# Patient Record
Sex: MALE | Race: WHITE | NOT HISPANIC OR LATINO | Employment: FULL TIME | URBAN - METROPOLITAN AREA
[De-identification: names, ages, dates, MRNs, and addresses within clinical notes are randomized per-mention and may not be internally consistent; named-entity substitution may affect disease eponyms.]

---

## 2017-02-06 ENCOUNTER — OFFICE VISIT (OUTPATIENT)
Dept: FAMILY MEDICINE CLINIC | Facility: CLINIC | Age: 37
End: 2017-02-06

## 2017-02-06 VITALS
HEIGHT: 66 IN | TEMPERATURE: 97.6 F | WEIGHT: 159.8 LBS | DIASTOLIC BLOOD PRESSURE: 74 MMHG | OXYGEN SATURATION: 97 % | HEART RATE: 77 BPM | BODY MASS INDEX: 25.68 KG/M2 | SYSTOLIC BLOOD PRESSURE: 110 MMHG

## 2017-02-06 DIAGNOSIS — F41.9 ANXIETY: Primary | ICD-10-CM

## 2017-02-06 PROCEDURE — 99213 OFFICE O/P EST LOW 20 MIN: CPT | Performed by: FAMILY MEDICINE

## 2017-02-06 RX ORDER — ALPRAZOLAM 1 MG/1
TABLET ORAL
Qty: 120 TABLET | Refills: 2 | Status: SHIPPED | OUTPATIENT
Start: 2017-02-06 | End: 2017-09-05 | Stop reason: SDUPTHER

## 2017-02-06 NOTE — PROGRESS NOTES
Subjective   Dylan Blank is a 36 y.o. male.     History of Present Illness   Dylan Blank 36 y.o. male who presents today for routine follow up check and medication refills.  he has a history of   Patient Active Problem List   Diagnosis   • Anxiety   • DDD (degenerative disc disease), cervical   .  Since the last visit, he has overall felt well.  He has anxiety is well controlled with medication.  he has been compliant with current medications have reviewed them.  The patient denies medication side effects.        The following portions of the patient's history were reviewed and updated as appropriate: allergies, current medications, past family history, past medical history, past social history, past surgical history and problem list.    Review of Systems   Constitutional: Negative for fatigue.   Respiratory: Negative for shortness of breath.    Cardiovascular: Negative for chest pain and palpitations.   Gastrointestinal: Negative for nausea and vomiting.   Skin: Negative.    Psychiatric/Behavioral: The patient is nervous/anxious.        Objective   Physical Exam   Constitutional: He appears well-developed and well-nourished.   HENT:   Head: Normocephalic.   Eyes: EOM are normal. Pupils are equal, round, and reactive to light.   Cardiovascular: Normal rate, regular rhythm and normal heart sounds.    Pulmonary/Chest: Effort normal and breath sounds normal.   Skin: Skin is warm and dry.   Psychiatric: He has a normal mood and affect. His behavior is normal. Judgment and thought content normal.   Vitals reviewed.      Assessment/Plan   Dylan was seen today for med refill.    Diagnoses and all orders for this visit:    Anxiety  -     ALPRAZolam (XANAX) 1 MG tablet; 1 po Q 6 hrs prn

## 2017-09-05 ENCOUNTER — OFFICE VISIT (OUTPATIENT)
Dept: FAMILY MEDICINE CLINIC | Facility: CLINIC | Age: 37
End: 2017-09-05

## 2017-09-05 VITALS
HEART RATE: 69 BPM | BODY MASS INDEX: 24.59 KG/M2 | TEMPERATURE: 98.7 F | SYSTOLIC BLOOD PRESSURE: 120 MMHG | DIASTOLIC BLOOD PRESSURE: 78 MMHG | WEIGHT: 153 LBS | RESPIRATION RATE: 16 BRPM | HEIGHT: 66 IN

## 2017-09-05 DIAGNOSIS — F43.10 PTSD (POST-TRAUMATIC STRESS DISORDER): ICD-10-CM

## 2017-09-05 PROCEDURE — 99213 OFFICE O/P EST LOW 20 MIN: CPT | Performed by: FAMILY MEDICINE

## 2017-09-05 RX ORDER — ALPRAZOLAM 1 MG/1
1 TABLET ORAL 2 TIMES DAILY PRN
Qty: 60 TABLET | Refills: 0 | Status: ON HOLD | OUTPATIENT
Start: 2017-09-05 | End: 2018-01-30

## 2017-09-05 NOTE — PROGRESS NOTES
"Subjective   Dylan Blank is a 37 y.o. male.     History of Present Illness     Chief Complaint:   Chief Complaint   Patient presents with   • Anxiety     MED REFILL - NATASHA -= FORMER DR KLEIN - PT DID NOT RECEIVE LETTER IN MAIL  -PHQ9 DONE        Dylan Blank 37 y.o. male who presents today To see me for the first time (former Dr. Hernandez pt)  for Medical Management of the below listed issues and medication refills.  he has a problem list of   Patient Active Problem List   Diagnosis   • Anxiety   • DDD (degenerative disc disease), cervical   .  Since the last visit, he has overall felt well.  he has been compliant with   Current Outpatient Prescriptions:   •  ALPRAZolam (XANAX) 1 MG tablet, Take 1 tablet by mouth 2 (Two) Times a Day As Needed for Anxiety., Disp: 60 tablet, Rfl: 0.  he denies medication side effects.    All of the chronic condition(s) listed above are stable w/o issues.    /78  Pulse 69  Temp 98.7 °F (37.1 °C) (Oral)   Resp 16  Ht 66\" (167.6 cm)  Wt 153 lb (69.4 kg)  BMI 24.69 kg/m2    Results for orders placed or performed in visit on 01/06/16    COLONOSCOPY   Result Value Ref Range     Colonoscopy no polyps; repeat 5 years          The following portions of the patient's history were reviewed and updated as appropriate: allergies, current medications, past family history, past medical history, past social history, past surgical history and problem list.    Review of Systems   Constitutional: Negative for activity change, chills, fatigue and fever.   Respiratory: Negative for cough and shortness of breath.    Cardiovascular: Negative for chest pain and palpitations.   Gastrointestinal: Negative for abdominal pain.   Endocrine: Negative for cold intolerance.   Psychiatric/Behavioral: Negative for behavioral problems, dysphoric mood and suicidal ideas. The patient is not nervous/anxious.      /78  Pulse 69  Temp 98.7 °F (37.1 °C) (Oral)   Resp 16  Ht 66\" (167.6 cm)  " Wt 153 lb (69.4 kg)  BMI 24.69 kg/m2    Objective   Physical Exam   Constitutional: He appears well-developed and well-nourished.   Neck: Neck supple. No thyromegaly present.   Cardiovascular: Normal rate and regular rhythm.    No murmur heard.  Pulmonary/Chest: Effort normal and breath sounds normal.   Abdominal: Bowel sounds are normal.   Psychiatric: He has a normal mood and affect. His behavior is normal.   Nursing note and vitals reviewed.    The patient has read and signed the [facility] Controlled Substance Contract.  I will continue to see patient for regular follow up appointments.  They are well controlled on their medication.  NATASHA has been reviewed by me and is updated every 3 months. The patient is aware of the potential for addiction and dependence.    Assessment/Plan   Dylan was seen today for anxiety.    Diagnoses and all orders for this visit:    PTSD (post-traumatic stress disorder)  -     ALPRAZolam (XANAX) 1 MG tablet; Take 1 tablet by mouth 2 (Two) Times a Day As Needed for Anxiety.  -     Ambulatory Referral to Psychiatry    Pt understands this is the only script for the Xanax I will be giving him and that he will need to be seeing psychiatry going forward for this.

## 2017-12-08 ENCOUNTER — OFFICE VISIT (OUTPATIENT)
Dept: FAMILY MEDICINE CLINIC | Facility: CLINIC | Age: 37
End: 2017-12-08

## 2017-12-08 VITALS
OXYGEN SATURATION: 97 % | HEIGHT: 66 IN | RESPIRATION RATE: 18 BRPM | TEMPERATURE: 98 F | HEART RATE: 68 BPM | WEIGHT: 161 LBS | DIASTOLIC BLOOD PRESSURE: 93 MMHG | BODY MASS INDEX: 25.88 KG/M2 | SYSTOLIC BLOOD PRESSURE: 143 MMHG

## 2017-12-08 DIAGNOSIS — J02.9 SORE THROAT: Primary | ICD-10-CM

## 2017-12-08 LAB
EXPIRATION DATE: NORMAL
INTERNAL CONTROL: NORMAL
Lab: NORMAL
S PYO AG THROAT QL: NEGATIVE

## 2017-12-08 PROCEDURE — 87880 STREP A ASSAY W/OPTIC: CPT | Performed by: NURSE PRACTITIONER

## 2017-12-08 PROCEDURE — 99213 OFFICE O/P EST LOW 20 MIN: CPT | Performed by: NURSE PRACTITIONER

## 2017-12-08 RX ORDER — PREDNISONE 20 MG/1
20 TABLET ORAL 2 TIMES DAILY
Qty: 10 TABLET | Refills: 0 | Status: SHIPPED | OUTPATIENT
Start: 2017-12-08 | End: 2017-12-13

## 2017-12-08 NOTE — PROGRESS NOTES
Subjective   Dylan Blank is a 37 y.o. male.     History of Present Illness   Dylan Blank 37 y.o. male who presents for evaluation of sore throat. Symptoms include sore throat, swollen glands, post nasal drip, headache, pain while swallowing and sinus pressure off and on for the past several days.  Onset of symptoms was 5 days ago, unchanged since that time. Patient denies shortness of breath, wheezing, fever.   Evaluation to date: none Treatment to date:  none.     The following portions of the patient's history were reviewed and updated as appropriate: allergies, current medications, past family history, past medical history, past social history, past surgical history and problem list.    Review of Systems   Constitutional: Negative for chills and fever.   HENT: Positive for postnasal drip and sinus pressure. Negative for congestion, ear pain and sinus pain.    Respiratory: Negative for cough.        Objective   Physical Exam   Constitutional: He is oriented to person, place, and time. He appears well-developed and well-nourished.   HENT:   Right Ear: Tympanic membrane, external ear and ear canal normal.   Left Ear: Tympanic membrane, external ear and ear canal normal.   Nose: Right sinus exhibits no maxillary sinus tenderness and no frontal sinus tenderness. Left sinus exhibits no maxillary sinus tenderness and no frontal sinus tenderness.   Mouth/Throat: Uvula is midline and oropharynx is clear and moist.   Cardiovascular: Normal rate and regular rhythm.    Pulmonary/Chest: Effort normal and breath sounds normal.   Neurological: He is oriented to person, place, and time.   Skin: Skin is warm and dry.   Psychiatric: He has a normal mood and affect. His behavior is normal. Judgment and thought content normal.   Nursing note and vitals reviewed.      Assessment/Plan   Dylan was seen today for sore throat.    Diagnoses and all orders for this visit:    Sore throat  -     POC Rapid Strep A  -     predniSONE  (DELTASONE) 20 MG tablet; Take 1 tablet by mouth 2 (Two) Times a Day for 5 days.

## 2018-01-29 ENCOUNTER — HOSPITAL ENCOUNTER (INPATIENT)
Facility: HOSPITAL | Age: 38
LOS: 3 days | Discharge: HOME | End: 2018-02-02
Attending: EMERGENCY MEDICINE | Admitting: INTERNAL MEDICINE

## 2018-01-29 ENCOUNTER — HOSPITAL ENCOUNTER (EMERGENCY)
Facility: HOSPITAL | Age: 38
Discharge: LEFT BEFORE DISCHARGE | End: 2018-01-29

## 2018-01-29 ENCOUNTER — APPOINTMENT (OUTPATIENT)
Dept: CT IMAGING | Facility: HOSPITAL | Age: 38
End: 2018-01-29

## 2018-01-29 ENCOUNTER — APPOINTMENT (OUTPATIENT)
Dept: ULTRASOUND IMAGING | Facility: HOSPITAL | Age: 38
End: 2018-01-29

## 2018-01-29 DIAGNOSIS — D69.6 THROMBOCYTOPENIA (HCC): ICD-10-CM

## 2018-01-29 DIAGNOSIS — J18.9 ATYPICAL PNEUMONIA: Primary | ICD-10-CM

## 2018-01-29 DIAGNOSIS — D72.810 LYMPHOPENIA: ICD-10-CM

## 2018-01-29 LAB
ALBUMIN SERPL-MCNC: 4.3 G/DL (ref 3.5–5.2)
ALBUMIN/GLOB SERPL: 1.3 G/DL
ALP SERPL-CCNC: 84 U/L (ref 39–117)
ALT SERPL W P-5'-P-CCNC: 88 U/L (ref 1–41)
ANION GAP SERPL CALCULATED.3IONS-SCNC: 12.9 MMOL/L
AST SERPL-CCNC: 38 U/L (ref 1–40)
BACTERIA UR QL AUTO: ABNORMAL /HPF
BASOPHILS # BLD AUTO: 0.01 10*3/MM3 (ref 0–0.2)
BASOPHILS NFR BLD AUTO: 0.3 % (ref 0–1.5)
BILIRUB SERPL-MCNC: 1 MG/DL (ref 0.1–1.2)
BILIRUB UR QL STRIP: NEGATIVE
BUN BLD-MCNC: 12 MG/DL (ref 6–20)
BUN/CREAT SERPL: 10.5 (ref 7–25)
CALCIUM SPEC-SCNC: 9.2 MG/DL (ref 8.6–10.5)
CHLORIDE SERPL-SCNC: 93 MMOL/L (ref 98–107)
CLARITY UR: ABNORMAL
CO2 SERPL-SCNC: 26.1 MMOL/L (ref 22–29)
COLOR UR: ABNORMAL
CREAT BLD-MCNC: 1.14 MG/DL (ref 0.76–1.27)
DEPRECATED RDW RBC AUTO: 40.2 FL (ref 37–54)
EOSINOPHIL # BLD AUTO: 0 10*3/MM3 (ref 0–0.7)
EOSINOPHIL NFR BLD AUTO: 0 % (ref 0.3–6.2)
ERYTHROCYTE [DISTWIDTH] IN BLOOD BY AUTOMATED COUNT: 12.4 % (ref 11.5–14.5)
GFR SERPL CREATININE-BSD FRML MDRD: 72 ML/MIN/1.73
GLOBULIN UR ELPH-MCNC: 3.4 GM/DL
GLUCOSE BLD-MCNC: 99 MG/DL (ref 65–99)
GLUCOSE UR STRIP-MCNC: NEGATIVE MG/DL
HCT VFR BLD AUTO: 44.1 % (ref 40.4–52.2)
HGB BLD-MCNC: 15.1 G/DL (ref 13.7–17.6)
HGB UR QL STRIP.AUTO: ABNORMAL
HYALINE CASTS UR QL AUTO: ABNORMAL /LPF
IMM GRANULOCYTES # BLD: 0 10*3/MM3 (ref 0–0.03)
IMM GRANULOCYTES NFR BLD: 0 % (ref 0–0.5)
INR PPP: 1.08 (ref 0.9–1.1)
KETONES UR QL STRIP: ABNORMAL
LEUKOCYTE ESTERASE UR QL STRIP.AUTO: NEGATIVE
LYMPHOCYTES # BLD AUTO: 0.56 10*3/MM3 (ref 0.9–4.8)
LYMPHOCYTES NFR BLD AUTO: 16.8 % (ref 19.6–45.3)
MCH RBC QN AUTO: 30.6 PG (ref 27–32.7)
MCHC RBC AUTO-ENTMCNC: 34.2 G/DL (ref 32.6–36.4)
MCV RBC AUTO: 89.5 FL (ref 79.8–96.2)
MONOCYTES # BLD AUTO: 0.14 10*3/MM3 (ref 0.2–1.2)
MONOCYTES NFR BLD AUTO: 4.2 % (ref 5–12)
NEUTROPHILS # BLD AUTO: 2.63 10*3/MM3 (ref 1.9–8.1)
NEUTROPHILS NFR BLD AUTO: 78.7 % (ref 42.7–76)
NITRITE UR QL STRIP: NEGATIVE
NRBC BLD MANUAL-RTO: 0 /100 WBC (ref 0–0)
PH UR STRIP.AUTO: 5.5 [PH] (ref 5–8)
PLATELET # BLD AUTO: 59 10*3/MM3 (ref 140–500)
PMV BLD AUTO: 10.5 FL (ref 6–12)
POTASSIUM BLD-SCNC: 4.4 MMOL/L (ref 3.5–5.2)
PROT SERPL-MCNC: 7.7 G/DL (ref 6–8.5)
PROT UR QL STRIP: ABNORMAL
PROTHROMBIN TIME: 13.6 SECONDS (ref 11.7–14.2)
RBC # BLD AUTO: 4.93 10*6/MM3 (ref 4.6–6)
RBC # UR: ABNORMAL /HPF
REF LAB TEST METHOD: ABNORMAL
SODIUM BLD-SCNC: 132 MMOL/L (ref 136–145)
SP GR UR STRIP: 1.02 (ref 1–1.03)
SQUAMOUS #/AREA URNS HPF: ABNORMAL /HPF
UROBILINOGEN UR QL STRIP: ABNORMAL
WBC NRBC COR # BLD: 3.34 10*3/MM3 (ref 4.5–10.7)
WBC UR QL AUTO: ABNORMAL /HPF

## 2018-01-29 PROCEDURE — 36415 COLL VENOUS BLD VENIPUNCTURE: CPT | Performed by: NURSE PRACTITIONER

## 2018-01-29 PROCEDURE — 81001 URINALYSIS AUTO W/SCOPE: CPT | Performed by: NURSE PRACTITIONER

## 2018-01-29 PROCEDURE — 85379 FIBRIN DEGRADATION QUANT: CPT | Performed by: EMERGENCY MEDICINE

## 2018-01-29 PROCEDURE — 93976 VASCULAR STUDY: CPT

## 2018-01-29 PROCEDURE — 99284 EMERGENCY DEPT VISIT MOD MDM: CPT

## 2018-01-29 PROCEDURE — 25010000002 ONDANSETRON PER 1 MG: Performed by: EMERGENCY MEDICINE

## 2018-01-29 PROCEDURE — 85610 PROTHROMBIN TIME: CPT | Performed by: EMERGENCY MEDICINE

## 2018-01-29 PROCEDURE — 74176 CT ABD & PELVIS W/O CONTRAST: CPT

## 2018-01-29 PROCEDURE — 80053 COMPREHEN METABOLIC PANEL: CPT | Performed by: NURSE PRACTITIONER

## 2018-01-29 PROCEDURE — 85025 COMPLETE CBC W/AUTO DIFF WBC: CPT | Performed by: NURSE PRACTITIONER

## 2018-01-29 PROCEDURE — 76870 US EXAM SCROTUM: CPT

## 2018-01-29 RX ORDER — IBUPROFEN 200 MG
200 TABLET ORAL EVERY 6 HOURS PRN
COMMUNITY
End: 2020-08-19

## 2018-01-29 RX ORDER — ONDANSETRON 2 MG/ML
4 INJECTION INTRAMUSCULAR; INTRAVENOUS ONCE
Status: COMPLETED | OUTPATIENT
Start: 2018-01-29 | End: 2018-01-29

## 2018-01-29 RX ORDER — HYDROMORPHONE HYDROCHLORIDE 1 MG/ML
0.5 INJECTION, SOLUTION INTRAMUSCULAR; INTRAVENOUS; SUBCUTANEOUS ONCE
Status: COMPLETED | OUTPATIENT
Start: 2018-01-29 | End: 2018-01-29

## 2018-01-29 RX ADMIN — SODIUM CHLORIDE 1000 ML: 9 INJECTION, SOLUTION INTRAVENOUS at 23:25

## 2018-01-29 RX ADMIN — HYDROMORPHONE HYDROCHLORIDE 0.5 MG: 10 INJECTION INTRAMUSCULAR; INTRAVENOUS; SUBCUTANEOUS at 23:20

## 2018-01-29 RX ADMIN — ONDANSETRON 4 MG: 2 INJECTION INTRAMUSCULAR; INTRAVENOUS at 23:20

## 2018-01-30 ENCOUNTER — APPOINTMENT (OUTPATIENT)
Dept: GENERAL RADIOLOGY | Facility: HOSPITAL | Age: 38
End: 2018-01-30

## 2018-01-30 PROBLEM — Z72.0 TOBACCO ABUSE: Status: ACTIVE | Noted: 2018-01-30

## 2018-01-30 PROBLEM — Z87.09 H/O INFLUENZA: Status: ACTIVE | Noted: 2018-01-30

## 2018-01-30 PROBLEM — R09.1: Status: ACTIVE | Noted: 2018-01-30

## 2018-01-30 PROBLEM — J18.9 ATYPICAL PNEUMONIA: Status: ACTIVE | Noted: 2018-01-30

## 2018-01-30 LAB
ALBUMIN SERPL-MCNC: 3.2 G/DL (ref 3.5–5.2)
ALBUMIN/GLOB SERPL: 1.2 G/DL
ALP SERPL-CCNC: 66 U/L (ref 39–117)
ALT SERPL W P-5'-P-CCNC: 64 U/L (ref 1–41)
ANION GAP SERPL CALCULATED.3IONS-SCNC: 12.6 MMOL/L
AST SERPL-CCNC: 27 U/L (ref 1–40)
B PERT DNA SPEC QL NAA+PROBE: NOT DETECTED
BASOPHILS # BLD AUTO: 0.01 10*3/MM3 (ref 0–0.2)
BASOPHILS NFR BLD AUTO: 0.3 % (ref 0–1.5)
BILIRUB SERPL-MCNC: 0.6 MG/DL (ref 0.1–1.2)
BUN BLD-MCNC: 11 MG/DL (ref 6–20)
BUN/CREAT SERPL: 11.7 (ref 7–25)
C PNEUM DNA NPH QL NAA+NON-PROBE: NOT DETECTED
CALCIUM SPEC-SCNC: 7.6 MG/DL (ref 8.6–10.5)
CHLORIDE SERPL-SCNC: 99 MMOL/L (ref 98–107)
CO2 SERPL-SCNC: 22.4 MMOL/L (ref 22–29)
CREAT BLD-MCNC: 0.94 MG/DL (ref 0.76–1.27)
D DIMER PPP FEU-MCNC: 11.89 MCGFEU/ML (ref 0–0.49)
D-LACTATE SERPL-SCNC: 1.1 MMOL/L (ref 0.5–2)
DEPRECATED RDW RBC AUTO: 40.4 FL (ref 37–54)
EOSINOPHIL # BLD AUTO: 0 10*3/MM3 (ref 0–0.7)
EOSINOPHIL NFR BLD AUTO: 0 % (ref 0.3–6.2)
ERYTHROCYTE [DISTWIDTH] IN BLOOD BY AUTOMATED COUNT: 12.4 % (ref 11.5–14.5)
ETHANOL BLD-MCNC: <10 MG/DL (ref 0–10)
ETHANOL UR QL: <0.01 %
FLUAV AG NPH QL: NEGATIVE
FLUAV H1 2009 PAND RNA NPH QL NAA+PROBE: NOT DETECTED
FLUAV H1 HA GENE NPH QL NAA+PROBE: NOT DETECTED
FLUAV H3 RNA NPH QL NAA+PROBE: NOT DETECTED
FLUAV SUBTYP SPEC NAA+PROBE: NOT DETECTED
FLUBV AG NPH QL IA: NEGATIVE
FLUBV RNA ISLT QL NAA+PROBE: NOT DETECTED
GFR SERPL CREATININE-BSD FRML MDRD: 90 ML/MIN/1.73
GLOBULIN UR ELPH-MCNC: 2.7 GM/DL
GLUCOSE BLD-MCNC: 110 MG/DL (ref 65–99)
HADV DNA SPEC NAA+PROBE: NOT DETECTED
HAV IGM SERPL QL IA: NORMAL
HBV CORE IGM SERPL QL IA: NORMAL
HBV SURFACE AG SERPL QL IA: NORMAL
HCOV 229E RNA SPEC QL NAA+PROBE: NOT DETECTED
HCOV HKU1 RNA SPEC QL NAA+PROBE: NOT DETECTED
HCOV NL63 RNA SPEC QL NAA+PROBE: NOT DETECTED
HCOV OC43 RNA SPEC QL NAA+PROBE: NOT DETECTED
HCT VFR BLD AUTO: 36.5 % (ref 40.4–52.2)
HCV AB SER DONR QL: NORMAL
HGB BLD-MCNC: 12.4 G/DL (ref 13.7–17.6)
HIV1 P24 AG SER QL: NORMAL
HIV1+2 AB SER QL: NORMAL
HMPV RNA NPH QL NAA+NON-PROBE: NOT DETECTED
HPIV1 RNA SPEC QL NAA+PROBE: NOT DETECTED
HPIV2 RNA SPEC QL NAA+PROBE: NOT DETECTED
HPIV3 RNA NPH QL NAA+PROBE: NOT DETECTED
HPIV4 P GENE NPH QL NAA+PROBE: NOT DETECTED
IMM GRANULOCYTES # BLD: 0 10*3/MM3 (ref 0–0.03)
IMM GRANULOCYTES NFR BLD: 0 % (ref 0–0.5)
LDH SERPL-CCNC: 540 U/L (ref 135–225)
LYMPHOCYTES # BLD AUTO: 0.85 10*3/MM3 (ref 0.9–4.8)
LYMPHOCYTES NFR BLD AUTO: 25.6 % (ref 19.6–45.3)
M PNEUMO IGG SER IA-ACNC: NOT DETECTED
MCH RBC QN AUTO: 30.6 PG (ref 27–32.7)
MCHC RBC AUTO-ENTMCNC: 34 G/DL (ref 32.6–36.4)
MCV RBC AUTO: 90.1 FL (ref 79.8–96.2)
MONOCYTES # BLD AUTO: 0.08 10*3/MM3 (ref 0.2–1.2)
MONOCYTES NFR BLD AUTO: 2.4 % (ref 5–12)
NEUTROPHILS # BLD AUTO: 2.38 10*3/MM3 (ref 1.9–8.1)
NEUTROPHILS NFR BLD AUTO: 71.7 % (ref 42.7–76)
PLATELET # BLD AUTO: 62 10*3/MM3 (ref 140–500)
PMV BLD AUTO: 10.2 FL (ref 6–12)
POTASSIUM BLD-SCNC: 3.8 MMOL/L (ref 3.5–5.2)
PROCALCITONIN SERPL-MCNC: 0.33 NG/ML (ref 0.1–0.25)
PROT SERPL-MCNC: 5.9 G/DL (ref 6–8.5)
RBC # BLD AUTO: 4.05 10*6/MM3 (ref 4.6–6)
RHINOVIRUS RNA SPEC NAA+PROBE: NOT DETECTED
RSV RNA NPH QL NAA+NON-PROBE: NOT DETECTED
SODIUM BLD-SCNC: 134 MMOL/L (ref 136–145)
WBC NRBC COR # BLD: 3.32 10*3/MM3 (ref 4.5–10.7)

## 2018-01-30 PROCEDURE — 87804 INFLUENZA ASSAY W/OPTIC: CPT | Performed by: EMERGENCY MEDICINE

## 2018-01-30 PROCEDURE — 85025 COMPLETE CBC W/AUTO DIFF WBC: CPT | Performed by: INTERNAL MEDICINE

## 2018-01-30 PROCEDURE — 80307 DRUG TEST PRSMV CHEM ANLYZR: CPT | Performed by: EMERGENCY MEDICINE

## 2018-01-30 PROCEDURE — 71046 X-RAY EXAM CHEST 2 VIEWS: CPT

## 2018-01-30 PROCEDURE — 87581 M.PNEUMON DNA AMP PROBE: CPT | Performed by: INTERNAL MEDICINE

## 2018-01-30 PROCEDURE — 83605 ASSAY OF LACTIC ACID: CPT | Performed by: EMERGENCY MEDICINE

## 2018-01-30 PROCEDURE — 87798 DETECT AGENT NOS DNA AMP: CPT | Performed by: INTERNAL MEDICINE

## 2018-01-30 PROCEDURE — 87899 AGENT NOS ASSAY W/OPTIC: CPT | Performed by: EMERGENCY MEDICINE

## 2018-01-30 PROCEDURE — 25010000002 AZITHROMYCIN PER 500 MG: Performed by: INTERNAL MEDICINE

## 2018-01-30 PROCEDURE — 25010000002 CEFTRIAXONE PER 250 MG: Performed by: EMERGENCY MEDICINE

## 2018-01-30 PROCEDURE — 87486 CHLMYD PNEUM DNA AMP PROBE: CPT | Performed by: INTERNAL MEDICINE

## 2018-01-30 PROCEDURE — 87633 RESP VIRUS 12-25 TARGETS: CPT | Performed by: INTERNAL MEDICINE

## 2018-01-30 PROCEDURE — 80074 ACUTE HEPATITIS PANEL: CPT | Performed by: INTERNAL MEDICINE

## 2018-01-30 PROCEDURE — 87040 BLOOD CULTURE FOR BACTERIA: CPT | Performed by: EMERGENCY MEDICINE

## 2018-01-30 PROCEDURE — 36415 COLL VENOUS BLD VENIPUNCTURE: CPT

## 2018-01-30 PROCEDURE — 83615 LACTATE (LD) (LDH) ENZYME: CPT | Performed by: EMERGENCY MEDICINE

## 2018-01-30 PROCEDURE — 80053 COMPREHEN METABOLIC PANEL: CPT | Performed by: INTERNAL MEDICINE

## 2018-01-30 PROCEDURE — 84145 PROCALCITONIN (PCT): CPT | Performed by: EMERGENCY MEDICINE

## 2018-01-30 PROCEDURE — G0432 EIA HIV-1/HIV-2 SCREEN: HCPCS | Performed by: EMERGENCY MEDICINE

## 2018-01-30 RX ORDER — IBUPROFEN 200 MG
200 TABLET ORAL EVERY 6 HOURS PRN
Status: DISCONTINUED | OUTPATIENT
Start: 2018-01-30 | End: 2018-02-02 | Stop reason: HOSPADM

## 2018-01-30 RX ORDER — CEFTRIAXONE SODIUM 1 G/50ML
1 INJECTION, SOLUTION INTRAVENOUS ONCE
Status: COMPLETED | OUTPATIENT
Start: 2018-01-30 | End: 2018-01-30

## 2018-01-30 RX ORDER — CEFTRIAXONE SODIUM 1 G/50ML
1 INJECTION, SOLUTION INTRAVENOUS EVERY 24 HOURS
Status: DISCONTINUED | OUTPATIENT
Start: 2018-01-31 | End: 2018-02-01

## 2018-01-30 RX ORDER — ALPRAZOLAM 0.5 MG/1
0.5 TABLET ORAL 2 TIMES DAILY PRN
Status: DISCONTINUED | OUTPATIENT
Start: 2018-01-30 | End: 2018-01-30

## 2018-01-30 RX ORDER — HYDROCODONE BITARTRATE AND ACETAMINOPHEN 5; 325 MG/1; MG/1
1 TABLET ORAL EVERY 6 HOURS PRN
Status: DISCONTINUED | OUTPATIENT
Start: 2018-01-30 | End: 2018-02-02 | Stop reason: HOSPADM

## 2018-01-30 RX ORDER — ACETAMINOPHEN 325 MG/1
650 TABLET ORAL EVERY 6 HOURS PRN
Status: DISCONTINUED | OUTPATIENT
Start: 2018-01-30 | End: 2018-01-31

## 2018-01-30 RX ORDER — SODIUM CHLORIDE 9 MG/ML
75 INJECTION, SOLUTION INTRAVENOUS CONTINUOUS
Status: DISCONTINUED | OUTPATIENT
Start: 2018-01-30 | End: 2018-02-02

## 2018-01-30 RX ORDER — ALPRAZOLAM 0.5 MG/1
0.5 TABLET ORAL 2 TIMES DAILY PRN
COMMUNITY
End: 2022-03-10

## 2018-01-30 RX ORDER — ONDANSETRON 2 MG/ML
4 INJECTION INTRAMUSCULAR; INTRAVENOUS EVERY 6 HOURS PRN
Status: DISCONTINUED | OUTPATIENT
Start: 2018-01-30 | End: 2018-02-02 | Stop reason: HOSPADM

## 2018-01-30 RX ORDER — ALPRAZOLAM 0.5 MG/1
0.5 TABLET ORAL 2 TIMES DAILY PRN
Status: DISCONTINUED | OUTPATIENT
Start: 2018-01-30 | End: 2018-01-31

## 2018-01-30 RX ORDER — ACETAMINOPHEN 500 MG
1000 TABLET ORAL ONCE
Status: COMPLETED | OUTPATIENT
Start: 2018-01-30 | End: 2018-01-30

## 2018-01-30 RX ADMIN — AZITHROMYCIN 500 MG: 500 INJECTION, POWDER, LYOPHILIZED, FOR SOLUTION INTRAVENOUS at 02:44

## 2018-01-30 RX ADMIN — IBUPROFEN 200 MG: 200 TABLET, FILM COATED ORAL at 16:40

## 2018-01-30 RX ADMIN — DOXYCYCLINE 100 MG: 100 INJECTION, POWDER, LYOPHILIZED, FOR SOLUTION INTRAVENOUS at 02:02

## 2018-01-30 RX ADMIN — ACETAMINOPHEN 650 MG: 325 TABLET, FILM COATED ORAL at 12:20

## 2018-01-30 RX ADMIN — SODIUM CHLORIDE 75 ML/HR: 9 INJECTION, SOLUTION INTRAVENOUS at 02:00

## 2018-01-30 RX ADMIN — ACETAMINOPHEN 1000 MG: 500 TABLET ORAL at 01:19

## 2018-01-30 RX ADMIN — HYDROCODONE BITARTRATE AND ACETAMINOPHEN 1 TABLET: 5; 325 TABLET ORAL at 12:25

## 2018-01-30 RX ADMIN — HYDROCODONE BITARTRATE AND ACETAMINOPHEN 1 TABLET: 5; 325 TABLET ORAL at 17:57

## 2018-01-30 RX ADMIN — CEFTRIAXONE SODIUM 1 G: 1 INJECTION, SOLUTION INTRAVENOUS at 23:03

## 2018-01-30 RX ADMIN — SODIUM CHLORIDE 75 ML/HR: 9 INJECTION, SOLUTION INTRAVENOUS at 14:34

## 2018-01-30 RX ADMIN — IBUPROFEN 200 MG: 200 TABLET, FILM COATED ORAL at 23:03

## 2018-01-30 RX ADMIN — ACETAMINOPHEN 650 MG: 325 TABLET, FILM COATED ORAL at 08:06

## 2018-01-30 RX ADMIN — ALPRAZOLAM 0.5 MG: 0.5 TABLET ORAL at 04:34

## 2018-01-30 RX ADMIN — CEFTRIAXONE SODIUM 1 G: 1 INJECTION, SOLUTION INTRAVENOUS at 01:39

## 2018-01-30 RX ADMIN — ACETAMINOPHEN 325 MG: 325 TABLET, FILM COATED ORAL at 17:57

## 2018-01-30 RX ADMIN — ALPRAZOLAM 0.5 MG: 0.5 TABLET ORAL at 23:03

## 2018-01-31 PROBLEM — D69.6 THROMBOCYTOPENIA (HCC): Status: ACTIVE | Noted: 2018-01-31

## 2018-01-31 LAB
BASOPHILS # BLD AUTO: 0.01 10*3/MM3 (ref 0–0.2)
BASOPHILS NFR BLD AUTO: 0.2 % (ref 0–1.5)
DEPRECATED RDW RBC AUTO: 40.3 FL (ref 37–54)
EOSINOPHIL # BLD AUTO: 0 10*3/MM3 (ref 0–0.7)
EOSINOPHIL NFR BLD AUTO: 0 % (ref 0.3–6.2)
ERYTHROCYTE [DISTWIDTH] IN BLOOD BY AUTOMATED COUNT: 12.4 % (ref 11.5–14.5)
HCT VFR BLD AUTO: 35.6 % (ref 40.4–52.2)
HGB BLD-MCNC: 12.5 G/DL (ref 13.7–17.6)
IMM GRANULOCYTES # BLD: 0 10*3/MM3 (ref 0–0.03)
IMM GRANULOCYTES NFR BLD: 0 % (ref 0–0.5)
LYMPHOCYTES # BLD AUTO: 0.77 10*3/MM3 (ref 0.9–4.8)
LYMPHOCYTES NFR BLD AUTO: 13 % (ref 19.6–45.3)
MCH RBC QN AUTO: 31.1 PG (ref 27–32.7)
MCHC RBC AUTO-ENTMCNC: 35.1 G/DL (ref 32.6–36.4)
MCV RBC AUTO: 88.6 FL (ref 79.8–96.2)
MONOCYTES # BLD AUTO: 0.29 10*3/MM3 (ref 0.2–1.2)
MONOCYTES NFR BLD AUTO: 4.9 % (ref 5–12)
NEUTROPHILS # BLD AUTO: 4.85 10*3/MM3 (ref 1.9–8.1)
NEUTROPHILS NFR BLD AUTO: 81.9 % (ref 42.7–76)
PLATELET # BLD AUTO: 75 10*3/MM3 (ref 140–500)
PMV BLD AUTO: 10.5 FL (ref 6–12)
RBC # BLD AUTO: 4.02 10*6/MM3 (ref 4.6–6)
WBC NRBC COR # BLD: 5.92 10*3/MM3 (ref 4.5–10.7)

## 2018-01-31 PROCEDURE — 25010000002 CEFTRIAXONE PER 250 MG: Performed by: INTERNAL MEDICINE

## 2018-01-31 PROCEDURE — 90791 PSYCH DIAGNOSTIC EVALUATION: CPT

## 2018-01-31 PROCEDURE — 85025 COMPLETE CBC W/AUTO DIFF WBC: CPT | Performed by: INTERNAL MEDICINE

## 2018-01-31 RX ORDER — ALPRAZOLAM 0.5 MG/1
0.5 TABLET ORAL NIGHTLY PRN
Status: DISCONTINUED | OUTPATIENT
Start: 2018-01-31 | End: 2018-02-02 | Stop reason: HOSPADM

## 2018-01-31 RX ORDER — ALPRAZOLAM 0.25 MG/1
0.25 TABLET ORAL 2 TIMES DAILY PRN
Status: DISCONTINUED | OUTPATIENT
Start: 2018-01-31 | End: 2018-02-02 | Stop reason: HOSPADM

## 2018-01-31 RX ORDER — AZITHROMYCIN 250 MG/1
500 TABLET, FILM COATED ORAL DAILY
Status: DISCONTINUED | OUTPATIENT
Start: 2018-01-31 | End: 2018-02-01

## 2018-01-31 RX ORDER — ECHINACEA PURPUREA EXTRACT 125 MG
2 TABLET ORAL AS NEEDED
Status: DISCONTINUED | OUTPATIENT
Start: 2018-01-31 | End: 2018-02-02 | Stop reason: HOSPADM

## 2018-01-31 RX ORDER — ACETAMINOPHEN 325 MG/1
650 TABLET ORAL EVERY 4 HOURS PRN
Status: DISCONTINUED | OUTPATIENT
Start: 2018-01-31 | End: 2018-02-02 | Stop reason: HOSPADM

## 2018-01-31 RX ADMIN — IBUPROFEN 200 MG: 200 TABLET, FILM COATED ORAL at 18:22

## 2018-01-31 RX ADMIN — ACETAMINOPHEN 650 MG: 325 TABLET, FILM COATED ORAL at 21:40

## 2018-01-31 RX ADMIN — IBUPROFEN 200 MG: 200 TABLET, FILM COATED ORAL at 09:16

## 2018-01-31 RX ADMIN — ACETAMINOPHEN 650 MG: 325 TABLET, FILM COATED ORAL at 00:08

## 2018-01-31 RX ADMIN — AZITHROMYCIN 500 MG: 250 TABLET, FILM COATED ORAL at 11:35

## 2018-01-31 RX ADMIN — ACETAMINOPHEN 650 MG: 325 TABLET, FILM COATED ORAL at 05:54

## 2018-01-31 RX ADMIN — SODIUM CHLORIDE 75 ML/HR: 9 INJECTION, SOLUTION INTRAVENOUS at 03:18

## 2018-01-31 RX ADMIN — SODIUM CHLORIDE 75 ML/HR: 9 INJECTION, SOLUTION INTRAVENOUS at 15:56

## 2018-01-31 RX ADMIN — ALPRAZOLAM 0.5 MG: 0.5 TABLET ORAL at 23:06

## 2018-01-31 RX ADMIN — ACETAMINOPHEN 650 MG: 325 TABLET, FILM COATED ORAL at 10:09

## 2018-01-31 RX ADMIN — ACETAMINOPHEN 650 MG: 325 TABLET, FILM COATED ORAL at 17:26

## 2018-02-01 LAB
BASOPHILS # BLD AUTO: 0.01 10*3/MM3 (ref 0–0.2)
BASOPHILS NFR BLD AUTO: 0.1 % (ref 0–1.5)
DEPRECATED RDW RBC AUTO: 40.2 FL (ref 37–54)
EOSINOPHIL # BLD AUTO: 0.01 10*3/MM3 (ref 0–0.7)
EOSINOPHIL NFR BLD AUTO: 0.1 % (ref 0.3–6.2)
ERYTHROCYTE [DISTWIDTH] IN BLOOD BY AUTOMATED COUNT: 12.5 % (ref 11.5–14.5)
HCT VFR BLD AUTO: 34.7 % (ref 40.4–52.2)
HGB BLD-MCNC: 11.7 G/DL (ref 13.7–17.6)
IMM GRANULOCYTES # BLD: 0.02 10*3/MM3 (ref 0–0.03)
IMM GRANULOCYTES NFR BLD: 0.3 % (ref 0–0.5)
LYMPHOCYTES # BLD AUTO: 0.97 10*3/MM3 (ref 0.9–4.8)
LYMPHOCYTES NFR BLD AUTO: 13.2 % (ref 19.6–45.3)
MCH RBC QN AUTO: 30.2 PG (ref 27–32.7)
MCHC RBC AUTO-ENTMCNC: 33.7 G/DL (ref 32.6–36.4)
MCV RBC AUTO: 89.4 FL (ref 79.8–96.2)
MONOCYTES # BLD AUTO: 0.57 10*3/MM3 (ref 0.2–1.2)
MONOCYTES NFR BLD AUTO: 7.8 % (ref 5–12)
NEUTROPHILS # BLD AUTO: 5.76 10*3/MM3 (ref 1.9–8.1)
NEUTROPHILS NFR BLD AUTO: 78.5 % (ref 42.7–76)
PLATELET # BLD AUTO: 108 10*3/MM3 (ref 140–500)
PMV BLD AUTO: 10.4 FL (ref 6–12)
RBC # BLD AUTO: 3.88 10*6/MM3 (ref 4.6–6)
WBC NRBC COR # BLD: 7.34 10*3/MM3 (ref 4.5–10.7)

## 2018-02-01 PROCEDURE — 25010000002 PIPERACILLIN SOD-TAZOBACTAM PER 1 G: Performed by: INTERNAL MEDICINE

## 2018-02-01 PROCEDURE — 85025 COMPLETE CBC W/AUTO DIFF WBC: CPT | Performed by: INTERNAL MEDICINE

## 2018-02-01 PROCEDURE — 25010000002 VANCOMYCIN PER 500 MG: Performed by: INTERNAL MEDICINE

## 2018-02-01 PROCEDURE — 25010000002 CEFTRIAXONE PER 250 MG: Performed by: INTERNAL MEDICINE

## 2018-02-01 RX ORDER — VANCOMYCIN HYDROCHLORIDE 1 G/200ML
15 INJECTION, SOLUTION INTRAVENOUS EVERY 8 HOURS
Status: DISCONTINUED | OUTPATIENT
Start: 2018-02-01 | End: 2018-02-02 | Stop reason: HOSPADM

## 2018-02-01 RX ADMIN — IBUPROFEN 200 MG: 200 TABLET, FILM COATED ORAL at 16:46

## 2018-02-01 RX ADMIN — TAZOBACTAM SODIUM AND PIPERACILLIN SODIUM 3.38 G: 375; 3 INJECTION, SOLUTION INTRAVENOUS at 20:52

## 2018-02-01 RX ADMIN — ALPRAZOLAM 0.25 MG: 0.25 TABLET ORAL at 16:04

## 2018-02-01 RX ADMIN — HYDROCODONE BITARTRATE AND ACETAMINOPHEN 1 TABLET: 5; 325 TABLET ORAL at 13:52

## 2018-02-01 RX ADMIN — VANCOMYCIN HYDROCHLORIDE 1000 MG: 1 INJECTION, SOLUTION INTRAVENOUS at 21:49

## 2018-02-01 RX ADMIN — SODIUM CHLORIDE 75 ML/HR: 9 INJECTION, SOLUTION INTRAVENOUS at 06:40

## 2018-02-01 RX ADMIN — ACETAMINOPHEN 650 MG: 325 TABLET, FILM COATED ORAL at 01:46

## 2018-02-01 RX ADMIN — SODIUM CHLORIDE 75 ML/HR: 9 INJECTION, SOLUTION INTRAVENOUS at 20:29

## 2018-02-01 RX ADMIN — CEFTRIAXONE SODIUM 1 G: 1 INJECTION, SOLUTION INTRAVENOUS at 00:48

## 2018-02-01 RX ADMIN — AZITHROMYCIN 500 MG: 250 TABLET, FILM COATED ORAL at 09:10

## 2018-02-01 RX ADMIN — ALPRAZOLAM 0.25 MG: 0.25 TABLET ORAL at 09:10

## 2018-02-01 RX ADMIN — ACETAMINOPHEN 650 MG: 325 TABLET, FILM COATED ORAL at 09:10

## 2018-02-01 RX ADMIN — HYDROCODONE BITARTRATE AND ACETAMINOPHEN 1 TABLET: 5; 325 TABLET ORAL at 17:52

## 2018-02-01 NOTE — PROGRESS NOTES
Continued Stay Note  [facility]     Patient Name: Dylan Blank  MRN: [id number]  Today's Date: 2/1/2018    Admit Date: 1/29/2018          Discharge Plan       02/01/18 1416    Case Management/Social Work Plan    Plan Plan home.  JOCELYN Mckeon    Additional Comments Spoke to pt at bedside.  Pt denies any discharge needs.  Plan home.  JOCELYN Mckeon              Discharge Codes     None            Laura Winter RN

## 2018-02-01 NOTE — PLAN OF CARE
Problem: Patient Care Overview (Adult)  Goal: Plan of Care Review  Outcome: Ongoing (interventions implemented as appropriate)   02/01/18 0646   Coping/Psychosocial Response Interventions   Plan Of Care Reviewed With patient   Patient Care Overview   Progress no change   Outcome Evaluation   Outcome Summary/Follow up Plan PT ANXIOUS, GETTING UPSET ABOUT ROOM TEMP , INSISTING ON GETTING TYLENOL EVEN WITH NICOLAS NORMAL. FINALLY SLEPT AROUND 1230 THRU 6AM. SINUS TACH. O2 IN USE 3L /NC, DROPS TO 86-88% WHEN MOVING OR UPSET. LAST TEMP AT 99.8 AT 540AM.      Goal: Adult Individualization and Mutuality  Outcome: Ongoing (interventions implemented as appropriate)    Goal: Discharge Needs Assessment  Outcome: Ongoing (interventions implemented as appropriate)      Problem: Pain, Acute (Adult)  Goal: Identify Related Risk Factors and Signs and Symptoms  Outcome: Ongoing (interventions implemented as appropriate)    Goal: Acceptable Pain Control/Comfort Level  Outcome: Ongoing (interventions implemented as appropriate)      Problem: Fall Risk (Adult)  Goal: Identify Related Risk Factors and Signs and Symptoms  Outcome: Ongoing (interventions implemented as appropriate)    Goal: Absence of Falls  Outcome: Ongoing (interventions implemented as appropriate)      Problem: Infection, Risk/Actual (Adult)  Goal: Identify Related Risk Factors and Signs and Symptoms  Outcome: Ongoing (interventions implemented as appropriate)    Goal: Infection Prevention/Resolution  Outcome: Ongoing (interventions implemented as appropriate)

## 2018-02-01 NOTE — PROGRESS NOTES
Name: Dylan Blank ADMIT: 2018   : 1980  PCP: VINH Bell    MRN: [id number] LOS: 2 days   AGE/SEX: 37 y.o. male    ROOM: [facility]   Subjective   Patient is feeling much better he walked in the halls and the fever is better.  He has a lot of anxiety.      Objective   Vital Signs  Temp:  [98.7 °F (37.1 °C)-102.9 °F (39.4 °C)] 98.8 °F (37.1 °C)  Heart Rate:  [] 104  Resp:  [18-20] 20  BP: (113-119)/(62-68) 119/62  SpO2:  [90 %-95 %] 92 %  on  Flow (L/min):  [2-3] 3;   O2 Device: nasal cannula  Body mass index is 25.62 kg/(m^2).    Physical Exam   Constitutional: He is oriented to person, place, and time. He appears well-developed and well-nourished. Nasal cannula in place.   HENT:   Head: Atraumatic.   Eyes: Pupils are equal, round, and reactive to light. No scleral icterus.   Cardiovascular: Normal rate and regular rhythm.    Pulmonary/Chest: No accessory muscle usage. No respiratory distress. He has decreased breath sounds. He has no wheezes.   Abdominal: Soft. Normal appearance and bowel sounds are normal. He exhibits no ascites. There is no hepatosplenomegaly.   Neurological: He is alert and oriented to person, place, and time.   Skin: No laceration and no petechiae noted. No cyanosis. Nails show no clubbing.   Psychiatric: He has a normal mood and affect. His behavior is normal.   Vitals reviewed.      Results Review:      Results from last 7 days  Lab Units 18  0557 18  0630 18  0537   WBC 10*3/mm3 7.34 5.92 3.32*   HEMOGLOBIN g/dL 11.7* 12.5* 12.4*   HEMATOCRIT % 34.7* 35.6* 36.5*   PLATELETS 10*3/mm3 108* 75* 62*       Results from last 7 days  Lab Units 18  0537 189   SODIUM mmol/L 134* 132*   POTASSIUM mmol/L 3.8 4.4   CHLORIDE mmol/L 99 93*   CO2 mmol/L 22.4 26.1   BUN mg/dL 11 12   CREATININE mg/dL 0.94 1.14   GLUCOSE mg/dL 110* 99   CALCIUM mg/dL 7.6* 9.2       Results from last 7 days  Lab Units 18  2323   INR  1.08            azithromycin 500 mg Oral Daily   ceftriaxone 1 g Intravenous Q24H       sodium chloride 75 mL/hr Last Rate: 75 mL/hr (02/01/18 0640)   Diet Regular      Assessment/Plan   Active Hospital Problems (** Indicates Principal Problem)    Diagnosis Date Noted   • **Atypical pneumonia [J18.9] 01/30/2018   • Thrombocytopenia [D69.6] 01/31/2018   • Bacterial pleurisy [R09.1] 01/30/2018   • Tobacco abuse [Z72.0] 01/30/2018   • H/O influenza [Z87.09] 01/30/2018      Resolved Hospital Problems    Diagnosis Date Noted Date Resolved   No resolved problems to display.     Platelets improving, So his his white count is also improving.      Addendum  The nurse taking care of the patient called me this evening informing me that he is still having fever. .  At the time of admission he had leukopenia most likely secondary to viral infection but now his white count is increasing which is a good sign.  His CAT scan of the abdomen shows bilateral lower lobe infiltrates.  Clearly patient is not responding to Rocephin and azithromycin and some going to change antibiotics to Zosyn and vancomycin and follow-up with labs.  If he continues to have fever spikes tomorrow we need a dedicated CT of the chest.         Jessica Jiménez MD  [facility]  02/01/18

## 2018-02-02 ENCOUNTER — APPOINTMENT (OUTPATIENT)
Dept: CT IMAGING | Facility: HOSPITAL | Age: 38
End: 2018-02-02

## 2018-02-02 VITALS
OXYGEN SATURATION: 90 % | SYSTOLIC BLOOD PRESSURE: 117 MMHG | HEART RATE: 99 BPM | RESPIRATION RATE: 17 BRPM | DIASTOLIC BLOOD PRESSURE: 73 MMHG | WEIGHT: 158.73 LBS | TEMPERATURE: 98.7 F | HEIGHT: 66 IN | BODY MASS INDEX: 25.51 KG/M2

## 2018-02-02 PROBLEM — I47.1 SVT (SUPRAVENTRICULAR TACHYCARDIA) (HCC): Status: ACTIVE | Noted: 2018-02-02

## 2018-02-02 PROBLEM — J96.01 ACUTE RESPIRATORY FAILURE WITH HYPOXIA (HCC): Status: ACTIVE | Noted: 2018-02-02

## 2018-02-02 PROBLEM — I47.10 SVT (SUPRAVENTRICULAR TACHYCARDIA): Status: ACTIVE | Noted: 2018-02-02

## 2018-02-02 PROBLEM — D69.6 THROMBOCYTOPENIA (HCC): Status: RESOLVED | Noted: 2018-01-31 | Resolved: 2018-02-02

## 2018-02-02 PROBLEM — D64.9 ANEMIA: Status: ACTIVE | Noted: 2018-02-02

## 2018-02-02 LAB
ANION GAP SERPL CALCULATED.3IONS-SCNC: 8.6 MMOL/L
BASOPHILS # BLD AUTO: 0.02 10*3/MM3 (ref 0–0.2)
BASOPHILS NFR BLD AUTO: 0.3 % (ref 0–1.5)
BUN BLD-MCNC: 11 MG/DL (ref 6–20)
BUN/CREAT SERPL: 14.1 (ref 7–25)
CALCIUM SPEC-SCNC: 8 MG/DL (ref 8.6–10.5)
CHLORIDE SERPL-SCNC: 101 MMOL/L (ref 98–107)
CO2 SERPL-SCNC: 27.4 MMOL/L (ref 22–29)
CREAT BLD-MCNC: 0.78 MG/DL (ref 0.76–1.27)
DEPRECATED RDW RBC AUTO: 40.9 FL (ref 37–54)
EOSINOPHIL # BLD AUTO: 0.16 10*3/MM3 (ref 0–0.7)
EOSINOPHIL NFR BLD AUTO: 2.3 % (ref 0.3–6.2)
ERYTHROCYTE [DISTWIDTH] IN BLOOD BY AUTOMATED COUNT: 12.7 % (ref 11.5–14.5)
GFR SERPL CREATININE-BSD FRML MDRD: 111 ML/MIN/1.73
GLUCOSE BLD-MCNC: 108 MG/DL (ref 65–99)
HCT VFR BLD AUTO: 33.3 % (ref 40.4–52.2)
HGB BLD-MCNC: 11.5 G/DL (ref 13.7–17.6)
IMM GRANULOCYTES # BLD: 0.02 10*3/MM3 (ref 0–0.03)
IMM GRANULOCYTES NFR BLD: 0.3 % (ref 0–0.5)
LYMPHOCYTES # BLD AUTO: 1.11 10*3/MM3 (ref 0.9–4.8)
LYMPHOCYTES NFR BLD AUTO: 16.3 % (ref 19.6–45.3)
MCH RBC QN AUTO: 30.5 PG (ref 27–32.7)
MCHC RBC AUTO-ENTMCNC: 34.5 G/DL (ref 32.6–36.4)
MCV RBC AUTO: 88.3 FL (ref 79.8–96.2)
MONOCYTES # BLD AUTO: 0.54 10*3/MM3 (ref 0.2–1.2)
MONOCYTES NFR BLD AUTO: 7.9 % (ref 5–12)
NEUTROPHILS # BLD AUTO: 4.97 10*3/MM3 (ref 1.9–8.1)
NEUTROPHILS NFR BLD AUTO: 72.9 % (ref 42.7–76)
PLATELET # BLD AUTO: 171 10*3/MM3 (ref 140–500)
PMV BLD AUTO: 9.7 FL (ref 6–12)
POTASSIUM BLD-SCNC: 3.6 MMOL/L (ref 3.5–5.2)
RBC # BLD AUTO: 3.77 10*6/MM3 (ref 4.6–6)
SODIUM BLD-SCNC: 137 MMOL/L (ref 136–145)
WBC NRBC COR # BLD: 6.82 10*3/MM3 (ref 4.5–10.7)

## 2018-02-02 PROCEDURE — 25010000002 VANCOMYCIN PER 500 MG: Performed by: INTERNAL MEDICINE

## 2018-02-02 PROCEDURE — 0 IOPAMIDOL PER 1 ML: Performed by: INTERNAL MEDICINE

## 2018-02-02 PROCEDURE — 25010000002 PIPERACILLIN SOD-TAZOBACTAM PER 1 G: Performed by: INTERNAL MEDICINE

## 2018-02-02 PROCEDURE — 94618 PULMONARY STRESS TESTING: CPT

## 2018-02-02 PROCEDURE — 94799 UNLISTED PULMONARY SVC/PX: CPT

## 2018-02-02 PROCEDURE — 85025 COMPLETE CBC W/AUTO DIFF WBC: CPT | Performed by: INTERNAL MEDICINE

## 2018-02-02 PROCEDURE — 71275 CT ANGIOGRAPHY CHEST: CPT

## 2018-02-02 PROCEDURE — 80048 BASIC METABOLIC PNL TOTAL CA: CPT | Performed by: INTERNAL MEDICINE

## 2018-02-02 RX ORDER — SULFAMETHOXAZOLE AND TRIMETHOPRIM 800; 160 MG/1; MG/1
1 TABLET ORAL 2 TIMES DAILY
Qty: 8 TABLET | Refills: 0 | Status: SHIPPED | OUTPATIENT
Start: 2018-02-02 | End: 2018-07-10

## 2018-02-02 RX ORDER — ECHINACEA PURPUREA EXTRACT 125 MG
2 TABLET ORAL AS NEEDED
Start: 2018-02-02 | End: 2019-03-14

## 2018-02-02 RX ORDER — AMOXICILLIN AND CLAVULANATE POTASSIUM 875; 125 MG/1; MG/1
1 TABLET, FILM COATED ORAL EVERY 12 HOURS SCHEDULED
Qty: 8 TABLET | Refills: 0 | Status: SHIPPED | OUTPATIENT
Start: 2018-02-02 | End: 2018-07-10

## 2018-02-02 RX ADMIN — TAZOBACTAM SODIUM AND PIPERACILLIN SODIUM 3.38 G: 375; 3 INJECTION, SOLUTION INTRAVENOUS at 03:14

## 2018-02-02 RX ADMIN — ALPRAZOLAM 0.5 MG: 0.5 TABLET ORAL at 01:57

## 2018-02-02 RX ADMIN — ALPRAZOLAM 0.25 MG: 0.25 TABLET ORAL at 09:00

## 2018-02-02 RX ADMIN — ACETAMINOPHEN 650 MG: 325 TABLET, FILM COATED ORAL at 04:35

## 2018-02-02 RX ADMIN — VANCOMYCIN HYDROCHLORIDE 1000 MG: 1 INJECTION, SOLUTION INTRAVENOUS at 04:03

## 2018-02-02 RX ADMIN — ACETAMINOPHEN 650 MG: 325 TABLET, FILM COATED ORAL at 12:36

## 2018-02-02 RX ADMIN — IOPAMIDOL 95 ML: 755 INJECTION, SOLUTION INTRAVENOUS at 14:00

## 2018-02-02 RX ADMIN — TAZOBACTAM SODIUM AND PIPERACILLIN SODIUM 3.38 G: 375; 3 INJECTION, SOLUTION INTRAVENOUS at 10:04

## 2018-02-02 RX ADMIN — VANCOMYCIN HYDROCHLORIDE 1000 MG: 1 INJECTION, SOLUTION INTRAVENOUS at 12:24

## 2018-02-02 NOTE — DISCHARGE SUMMARY
Date of Admission: 1/29/2018  Date of Discharge:  2/2/2018  Primary Care Physician: Desire Ren, APRN     Discharge Diagnosis:  Active Hospital Problems (** Indicates Principal Problem)    Diagnosis Date Noted   • **Atypical pneumonia [J18.9] 01/30/2018   • Acute respiratory failure with hypoxia [J96.01] 02/02/2018   • SVT (supraventricular tachycardia) [I47.1] 02/02/2018   • Anemia [D64.9] 02/02/2018   • Bacterial pleurisy [R09.1] 01/30/2018   • Tobacco abuse [Z72.0] 01/30/2018   • H/O influenza [Z87.09] 01/30/2018      Resolved Hospital Problems    Diagnosis Date Noted Date Resolved   • Thrombocytopenia [D69.6] 01/31/2018 02/02/2018       Presenting Problem/History of Present Illness:  Lymphopenia [D72.810]  Thrombocytopenia [D69.6]  Atypical pneumonia [J18.9]     Hospital Course:  The patient is a 37 year old Man admitted for Left flank and testicular pain with fever, leukopenia and thrombocytopenia.  CT abdomen neg for nephrolithiasis but shows mild bibasilar opacities. Testicular u/s essentially negative.  He was initially placed on azithromcyin and ceftriaxone for CAP.  He had high fever yesterday so antibiotics were changed to Zosyn and vancomycin.  He has defervesced.  CT of the chest ordered because d-dimer was extremely elevated and he has never had a CT.  It showed no pulmonary embolus but groundglass opacity in both upper lobes.  Patient very anxious to go home and requested discharge today.  I would prefer that he states at night but he has 3 kids at home and needs to be discharged.  Walking oximetry showed that he maintained his O2 sats, so he will not need home O2.  He will need close outpatient follow-up to make sure lung findings resolved completely.  Sinus tachycardia present.  Patient attributes to nicotine withdrawal.  Heart rate has improved today.  IV fluids were discontinued earlier.  Mild anemias present.  Outpatient follow-up      Stable condition; good prognosis    Exam Today:   See today's note    Procedures Performed:  CT angiogram chest with PE protocol to 218 which showed no evidence of PE.  Ground glass opacities bilateral upper lobes.  Small layering bilateral pleural effusions.      CT abdomen pelvis without contrast 1/29/18 which showed patchy lower lobe opacities and tiny left effusion.  Constipation without obstruction  Ultrasound of the scrotum and testicles 1/29/18.  Tiny bilateral hydroceles with small left varicocele    Labs:  Lab Results   Component Value Date    WBC 6.82 02/02/2018    HGB 11.5 (L) 02/02/2018    HCT 33.3 (L) 02/02/2018     02/02/2018     Lab Results   Component Value Date     02/02/2018    K 3.6 02/02/2018     02/02/2018    CO2 27.4 02/02/2018    BUN 11 02/02/2018    CREATININE 0.78 02/02/2018    GLUCOSE 108 (H) 02/02/2018       Results from last 7 days  Lab Units 01/30/18  0137 01/30/18  0107   BLOODCX  No growth at 3 days No growth at 3 days       Consults:   None     Discharge Disposition:  Home or Self Care    Discharge Medications:   Dylan Blank   Home Medication Instructions FABRICE:[id number]    Printed on:02/02/18 1611   Medication Information                      ALPRAZolam (XANAX) 0.5 MG tablet  Take 0.5 mg by mouth 2 (Two) Times a Day As Needed for Anxiety (Pt takes 0.25mg TID prn).             amoxicillin-clavulanate (AUGMENTIN) 875-125 MG per tablet  Take 1 tablet by mouth Every 12 (Twelve) Hours.             ibuprofen (ADVIL,MOTRIN) 200 MG tablet  Take 200 mg by mouth Every 6 (Six) Hours As Needed for Mild Pain .             sodium chloride (OCEAN) 0.65 % nasal spray  2 sprays into each nostril As Needed for Congestion.             sulfamethoxazole-trimethoprim (BACTRIM DS,SEPTRA DS) 800-160 MG per tablet  Take 1 tablet by mouth 2 (Two) Times a Day.                 Discharge Diet:   Diet Instructions     Diet: Regular       Discharge Diet:  Regular                 Activity at Discharge:   Activity Instructions     Activity  as Tolerated                     Follow-up Appointments:    Follow-up Information     Follow up with VINH Bell Follow up in 1 week(s).    Specialty:  Family Medicine    Why:  Pneumonia    Contact information:    [address] [address] [address] [phone]              Test Results Pending at Discharge:   Order Current Status    Blood Culture - Blood, Preliminary result    Blood Culture - Blood, Preliminary result           Cynthia Sanchez MD  02/02/18  4:11 PM    Time Spent on Discharge Activities: 45 minutes.  Medical record reviewed.  Discussed with patient and with nurse.

## 2018-02-02 NOTE — PROGRESS NOTES
"     LOS: 3 days   Primary Care Physician: VINH Bell     Subjective   Tired.  Wants to go home.  Had a lot of sweating during the night.  Last fever was about 5 PM yesterday.  He attributes his elevated heart rate to nicotine withdrawal.  No dizziness or lightheadedness.  Coughing is less.  No pleuritic pain  Twin brother had a PE last year    Vital Signs  Body mass index is 25.62 kg/(m^2).  Temp:  [97.5 °F (36.4 °C)-102.9 °F (39.4 °C)] 98.7 °F (37.1 °C)  Heart Rate:  [] 99  Resp:  [16-19] 17  BP: (101-127)/(60-85) 117/73      Objective:  General Appearance:  In no acute distress.    Vital signs: (most recent): Blood pressure 119/83, pulse 102, temperature 98 °F (36.7 °C), temperature source Oral, resp. rate 19, height 167.6 cm (66\"), weight 72 kg (158 lb 11.7 oz), SpO2 91 %.    Lungs:  There are rales and decreased breath sounds.  No wheezes or rhonchi.  (A few crackles left lateral base initially which then cleared)  Heart: Tachycardia.  Regular rhythm.  No murmur.   Abdomen: Abdomen is soft and non-distended.  Bowel sounds are normal.   There is no abdominal tenderness.   There is no splenomegaly. There is no hepatomegaly.   Extremities: There is no dependent edema.    Neurological: Patient is alert.          Results Review:    I reviewed the patient's new clinical results.      Results from last 7 days  Lab Units 02/02/18  0723 02/01/18  0557   WBC 10*3/mm3 6.82 7.34   HEMOGLOBIN g/dL 11.5* 11.7*   PLATELETS 10*3/mm3 171 108*       Results from last 7 days  Lab Units 02/02/18  0723 01/30/18  0537   SODIUM mmol/L 137 134*   POTASSIUM mmol/L 3.6 3.8   CHLORIDE mmol/L 101 99   CO2 mmol/L 27.4 22.4   BUN mg/dL 11 11   CREATININE mg/dL 0.78 0.94   CALCIUM mg/dL 8.0* 7.6*   GLUCOSE mg/dL 108* 110*       Results from last 7 days  Lab Units 01/29/18  7272   INR  1.08     Hemoglobin A1C:No results found for: HGBA1C    Glucose Range:No results found for: POCGLU    No results found for: " [id number]    No results found for: TSH    Assessment & Plan      Medication Review: Yes    Active Hospital Problems (** Indicates Principal Problem)    Diagnosis Date Noted   • **Atypical pneumonia [J18.9] 01/30/2018   • Acute respiratory failure with hypoxia [J96.01] 02/02/2018   • SVT (supraventricular tachycardia) [I47.1] 02/02/2018   • Anemia [D64.9] 02/02/2018   • Bacterial pleurisy [R09.1] 01/30/2018   • Tobacco abuse [Z72.0] 01/30/2018   • H/O influenza [Z87.09] 01/30/2018      Resolved Hospital Problems    Diagnosis Date Noted Date Resolved   • Thrombocytopenia [D69.6] 01/31/2018 02/02/2018       Assessment/Plan  1.  Acute hypoxic respiratory failure with tachycardia and previous cough with shortness of breath.  D-dimer was greater than 10 so CT chest to rule out PE is indicated.  I ordered that.  Check room air oximetry.  Patient very anxious for discharge home.  He has 3 children.  We will see what CT shows and decide.  2.  SVT.  CT as above.  Could be some nicotine withdrawal as well.  3.  Anemia.  Fairly stable.  Outpatient follow-up    Cynthia Sanchez MD  02/02/18  11:35 AM

## 2018-02-02 NOTE — PROGRESS NOTES
"Pharmacokinetic Consult - Vancomycin & Zosyn Dosing (Initial Note)    Dylan Blank is a 37 y.o. male who is on day 1/5 pharmacy to dose vancomycin & Zosyn for pneumonia.  Pharmacy dosing per Dr. Jiménez's request.   Other antimicrobials: previously on Azithromycin + Ceftriaxone x2 days  Goal vancomycin trough: 15-20 mg/L    Relevant clinical data and objective history reviewed:  167.6 cm (66\")  72 kg (158 lb 11.7 oz)  Body mass index is 25.62 kg/(m^2).     He has a past medical history of Anxiety; DDD (degenerative disc disease), cervical; Lyme disease (2011); [place] spotted fever (2006); and Screening for prostate cancer.    Allergies as of 01/29/2018   • (No Known Allergies)     Vital Signs (last 24 hours)       01/31 0700  -  02/01 0659 02/01 0700  -  02/01 1900   Most Recent    Temp (°F) 98.7 -  (!)102.3    97.6 -  (!)102.9     99.1 (37.3)    Heart Rate 92 -  (!)122    98 -  (!)123     117    Resp   18    16 -  20     16    /58 -  119/73    119/62 -  127/85     127/85    SpO2 (%) 90 -  95    90 -  92     90        Estimated Creatinine Clearance: 108.5 mL/min (by C-G formula based on Cr of 0.94).    Results from last 7 days  Lab Units 01/30/18  0537 01/29/18  2059   CREATININE mg/dL 0.94 1.14       Results from last 7 days  Lab Units 02/01/18  0557 01/31/18  0630 01/30/18  0537   WBC 10*3/mm3 7.34 5.92 3.32*     Baseline culture/source/susceptibility:   BCx x2 (1/30): NGTD  RVP negative     Imaging:  CXR & CT: Bibasilar opacities     Labs:  PCT (1/30): 0.33    Anti-Infectives     Ordered     Dose/Rate Route Frequency Start Stop    02/01/18 1859  piperacillin-tazobactam (ZOSYN) 3.375 g in iso-osmotic dextrose 50 ml (premix)     Ordering Provider:  Jessica Jiménez MD    3.375 g  over 4 Hours Intravenous Every 8 Hours 02/02/18 0130 02/07/18 0129    02/01/18 1859  piperacillin-tazobactam (ZOSYN) 3.375 g in iso-osmotic dextrose 50 ml (premix)     Ordering Provider:  Jessica Jiménez MD    " 3.375 g  over 30 Minutes Intravenous Once 02/01/18 1930 02/01/18 1859  vancomycin (VANCOCIN) in iso-osmotic dextrose IVPB 1 g (premix) 200 mL     Ordering Provider:  Jessica Jiménez MD    15 mg/kg × 72 kg  over 100 Minutes Intravenous Every 8 Hours 02/01/18 1930 02/06/18 1929 02/01/18 1850  Pharmacy to dose vancomycin     Ordering Provider:  Jessica Jiménez MD     Does not apply Continuous PRN 02/01/18 1849 02/06/18 1848    02/01/18 1850  Pharmacy to Dose Zosyn     Ordering Provider:  Jessica Jiménez MD     Does not apply Continuous PRN 02/01/18 1848 02/06/18 1847    01/30/18 0049  cefTRIAXone (ROCEPHIN) IVPB 1 g     Ordering Provider:  Bro Lewis MD    1 g  100 mL/hr over 30 Minutes Intravenous Once 01/30/18 0051 01/30/18 0215    01/30/18 0049  doxycycline (VIBRAMYCIN) 100 mg/100 mL 0.9% NS MBP     Ordering Provider:  Bro Lewis MD    100 mg  over 60 Minutes Intravenous Once 01/30/18 0051 01/30/18 0302         No results found for: VANCOTROUGH  No results found for: VANCORANDOM    Assessment/Plan  1) Begin Vancomycin 1000 mg IV q8h. Trough ordered for 30 mins prior to 4th dose.   2) Begin Zosyn 3.375 g IV x1 over 30 mins followed by 3.375 g IV q8h over 4 hours based on current CrCl > 20 mL/min.   3) Will monitor serum creatinine with AM labs tomorrow.  4) Encourage hydration as allowed by MD to help prevent toxic accumulation; monitor for s/sxn of toxicity including increase in SCr and decrease in UOP.    Pharmacy will continue to follow daily while on vancomycin & Zosyn and adjust as needed.     Thank you for this consult,    Niranjan Alas, PharmD, YANCY, BCPS

## 2018-02-02 NOTE — PROGRESS NOTES
Exercise Oximetry    Patient Name:Dylan Blank   MRN: [id number]   Date: 02/02/18             ROOM AIR BASELINE   SpO2% 93   Heart Rate 91   Blood Pressure      EXERCISE ON ROOM AIR SpO2% EXERCISE ON O2 @  LPM SpO2%   1 MINUTE 93 1 MINUTE    2 MINUTES 94 2 MINUTES    3 MINUTES 94 3 MINUTES    4 MINUTES 95 4 MINUTES    5 MINUTES 93 5 MINUTES    6 MINUTES 92 6 MINUTES               Distance Walked   Distance Walked   Dyspnea (Tony Scale)   Dyspnea (Tony Scale)   Fatigue (Tony Scale)   Fatigue (Tony Scale)   SpO2% Post Exercise   SpO2% Post Exercise   HR Post Exercise   HR Post Exercise   Time to Recovery   Time to Recovery     Comments: pt walked around nursing unit x 2 and down the [address] for 6 minutes with no soa, gina very well, o2 sats better while walking

## 2018-02-02 NOTE — PLAN OF CARE
Problem: Patient Care Overview (Adult)  Goal: Plan of Care Review  Outcome: Ongoing (interventions implemented as appropriate)   02/02/18 0451   Coping/Psychosocial Response Interventions   Plan Of Care Reviewed With patient   Patient Care Overview   Progress improving   Outcome Evaluation   Outcome Summary/Follow up Plan No s/sx of distress noted this shift. Vital signs WDL. Temps have been 97.5 & 97.7 with last two checks. Patient was medicated with prn Tylenol x1 dose for headache that he states is from coughing. IVF's & antibiotics per orders. Will cont to monitor.     Goal: Adult Individualization and Mutuality  Outcome: Ongoing (interventions implemented as appropriate)    Goal: Discharge Needs Assessment  Outcome: Ongoing (interventions implemented as appropriate)      Problem: Pain, Acute (Adult)  Goal: Identify Related Risk Factors and Signs and Symptoms  Outcome: Ongoing (interventions implemented as appropriate)    Goal: Acceptable Pain Control/Comfort Level  Outcome: Ongoing (interventions implemented as appropriate)      Problem: Fall Risk (Adult)  Goal: Identify Related Risk Factors and Signs and Symptoms  Outcome: Ongoing (interventions implemented as appropriate)    Goal: Absence of Falls  Outcome: Ongoing (interventions implemented as appropriate)      Problem: Infection, Risk/Actual (Adult)  Goal: Identify Related Risk Factors and Signs and Symptoms  Outcome: Ongoing (interventions implemented as appropriate)    Goal: Infection Prevention/Resolution  Outcome: Ongoing (interventions implemented as appropriate)

## 2018-02-02 NOTE — PLAN OF CARE
Problem: Patient Care Overview (Adult)  Goal: Plan of Care Review  Outcome: Ongoing (interventions implemented as appropriate)   02/01/18 1911   Coping/Psychosocial Response Interventions   Plan Of Care Reviewed With patient   Patient Care Overview   Progress no change   Outcome Evaluation   Outcome Summary/Follow up Plan Patient's temperature elevated to 103.0, patient reports severe headache, neck pain / stiffness. Dr. Jiménez called, IV Zosyn and IV Vanc ordered. tachycardia - pulse in 130's, continue to monitor closely     Goal: Adult Individualization and Mutuality  Outcome: Ongoing (interventions implemented as appropriate)    Goal: Discharge Needs Assessment  Outcome: Ongoing (interventions implemented as appropriate)      Problem: Pain, Acute (Adult)  Goal: Identify Related Risk Factors and Signs and Symptoms  Outcome: Ongoing (interventions implemented as appropriate)    Goal: Acceptable Pain Control/Comfort Level  Outcome: Ongoing (interventions implemented as appropriate)      Problem: Fall Risk (Adult)  Goal: Identify Related Risk Factors and Signs and Symptoms  Outcome: Ongoing (interventions implemented as appropriate)    Goal: Absence of Falls  Outcome: Ongoing (interventions implemented as appropriate)      Problem: Infection, Risk/Actual (Adult)  Goal: Identify Related Risk Factors and Signs and Symptoms  Outcome: Ongoing (interventions implemented as appropriate)    Goal: Infection Prevention/Resolution  Outcome: Ongoing (interventions implemented as appropriate)

## 2018-02-02 NOTE — PLAN OF CARE
Problem: Patient Care Overview (Adult)  Goal: Plan of Care Review  Outcome: Ongoing (interventions implemented as appropriate)    Goal: Adult Individualization and Mutuality  Outcome: Ongoing (interventions implemented as appropriate)    Goal: Discharge Needs Assessment  Outcome: Ongoing (interventions implemented as appropriate)      Problem: Pain, Acute (Adult)  Goal: Identify Related Risk Factors and Signs and Symptoms  Outcome: Ongoing (interventions implemented as appropriate)    Goal: Acceptable Pain Control/Comfort Level  Outcome: Ongoing (interventions implemented as appropriate)      Problem: Fall Risk (Adult)  Goal: Identify Related Risk Factors and Signs and Symptoms  Outcome: Ongoing (interventions implemented as appropriate)    Goal: Absence of Falls  Outcome: Ongoing (interventions implemented as appropriate)      Problem: Infection, Risk/Actual (Adult)  Goal: Identify Related Risk Factors and Signs and Symptoms  Outcome: Ongoing (interventions implemented as appropriate)    Goal: Infection Prevention/Resolution  Outcome: Ongoing (interventions implemented as appropriate)

## 2018-02-04 LAB
BACTERIA SPEC AEROBE CULT: NORMAL
BACTERIA SPEC AEROBE CULT: NORMAL

## 2018-02-05 NOTE — PROGRESS NOTES
Case Management Discharge Note    Final Note: Plan home.  JOCELYN Mckeon    Discharge Placement     No information found        Other: Other (Private Auto)    Discharge Codes: 01  Discharge to home

## 2018-02-05 NOTE — PAYOR COMM NOTE
"Dylan Pierce (37 y.o. Male)                                                            REF#[id number]                                     CONTACT=   CHARLI PATINO                                   FAX   [phone]                              [phone]                     DISCHG SUM ATTACHED          Date of Birth Social Security Number Address Home Phone MRN    [id number]  [id number] [place] [address] [phone] [id number]    Syn[employer] Marital Status          None Unknown       Admission Date Admission Type Admitting Provider Attending Provider Department, Room/Bed    1/29/18 Emergency Jessica Jiménez MD  [address], 669/1    Discharge Date Discharge Disposition Discharge Destination        2/2/2018 Home or Self Care             Attending Provider: (none)    Allergies:  No Known Allergies    Isolation:  None   Infection:  None   Code Status:  Prior    Ht:  167.6 cm (66\")   Wt:  72 kg (158 lb 11.7 oz)    Admission Cmt:  None   Principal Problem:  Atypical pneumonia [J18.9]                 Active Insurance as of 1/29/2018     Primary Coverage     Payor Plan Insurance Group Employer/Plan Group    ANTHEM BLUE CROSS AN[employer]M BLUE CROSS BLUE SHIELD O [id number]     Payor Plan Address Payor Plan Phone Number Effective From Effective To    [address] [phone] 2/1/2016     [place] [address]       Subscriber Name Subscriber Birth Date Member ID       DYLAN PIERCE 1980 [id number]                 Emergency Contacts      (Rel.) Home Phone Work Phone Mobile Phone    Kasandra Pierce (Spouse) -- -- [phone]            Insurance Information                [employer]HE[employer] BLUE CROSS/[employer]HE[employer] BLUE CROSS BLUE SHIELD PPO Phone: [phone]    Subscriber: Dylan Pierce Subscriber#: [id number]    Group#: [id number] Precert#:              Discharge Summary      Cynthia Sanchez MD at 2/2/2018  4:11 PM          Date of Admission: 1/29/2018  Date of " Discharge:  2/2/2018  Primary Care Physician: VINH Bell     Discharge Diagnosis:  Active Hospital Problems (** Indicates Principal Problem)    Diagnosis Date Noted   • **Atypical pneumonia [J18.9] 01/30/2018   • Acute respiratory failure with hypoxia [J96.01] 02/02/2018   • SVT (supraventricular tachycardia) [I47.1] 02/02/2018   • Anemia [D64.9] 02/02/2018   • Bacterial pleurisy [R09.1] 01/30/2018   • Tobacco abuse [Z72.0] 01/30/2018   • H/O influenza [Z87.09] 01/30/2018      Resolved Hospital Problems    Diagnosis Date Noted Date Resolved   • Thrombocytopenia [D69.6] 01/31/2018 02/02/2018       Presenting Problem/History of Present Illness:  Lymphopenia [D72.810]  Thrombocytopenia [D69.6]  Atypical pneumonia [J18.9]     Hospital Course:  The patient is a 37 year old Man admitted for Left flank and testicular pain with fever, leukopenia and thrombocytopenia.  CT abdomen neg for nephrolithiasis but shows mild bibasilar opacities. Testicular u/s essentially negative.  He was initially placed on azithromcyin and ceftriaxone for CAP.  He had high fever yesterday so antibiotics were changed to Zosyn and vancomycin.  He has defervesced.  CT of the chest ordered because d-dimer was extremely elevated and he has never had a CT.  It showed no pulmonary embolus but groundglass opacity in both upper lobes.  Patient very anxious to go home and requested discharge today.  I would prefer that he states at night but he has 3 kids at home and needs to be discharged.  Walking oximetry showed that he maintained his O2 sats, so he will not need home O2.  He will need close outpatient follow-up to make sure lung findings resolved completely.  Sinus tachycardia present.  Patient attributes to nicotine withdrawal.  Heart rate has improved today.  IV fluids were discontinued earlier.  Mild anemias present.  Outpatient follow-up      Stable condition; good prognosis    Exam Today:  See today's note    Procedures  Performed:  CT angiogram chest with PE protocol to 218 which showed no evidence of PE.  Ground glass opacities bilateral upper lobes.  Small layering bilateral pleural effusions.      CT abdomen pelvis without contrast 1/29/18 which showed patchy lower lobe opacities and tiny left effusion.  Constipation without obstruction  Ultrasound of the scrotum and testicles 1/29/18.  Tiny bilateral hydroceles with small left varicocele    Labs:  Lab Results   Component Value Date    WBC 6.82 02/02/2018    HGB 11.5 (L) 02/02/2018    HCT 33.3 (L) 02/02/2018     02/02/2018     Lab Results   Component Value Date     02/02/2018    K 3.6 02/02/2018     02/02/2018    CO2 27.4 02/02/2018    BUN 11 02/02/2018    CREATININE 0.78 02/02/2018    GLUCOSE 108 (H) 02/02/2018       Results from last 7 days  Lab Units 01/30/18  0137 01/30/18  0107   BLOODCX  No growth at 3 days No growth at 3 days       Consults:   None     Discharge Disposition:  Home or Self Care    Discharge Medications:   Dylan Blank   Home Medication Instructions FABRICE:[id number]    Printed on:02/02/18 1611   Medication Information                      ALPRAZolam (XANAX) 0.5 MG tablet  Take 0.5 mg by mouth 2 (Two) Times a Day As Needed for Anxiety (Pt takes 0.25mg TID prn).             amoxicillin-clavulanate (AUGMENTIN) 875-125 MG per tablet  Take 1 tablet by mouth Every 12 (Twelve) Hours.             ibuprofen (ADVIL,MOTRIN) 200 MG tablet  Take 200 mg by mouth Every 6 (Six) Hours As Needed for Mild Pain .             sodium chloride (OCEAN) 0.65 % nasal spray  2 sprays into each nostril As Needed for Congestion.             sulfamethoxazole-trimethoprim (BACTRIM DS,SEPTRA DS) 800-160 MG per tablet  Take 1 tablet by mouth 2 (Two) Times a Day.                 Discharge Diet:   Diet Instructions     Diet: Regular       Discharge Diet:  Regular                 Activity at Discharge:   Activity Instructions     Activity as Tolerated                      Follow-up Appointments:    Follow-up Information     Follow up with VINH Bell Follow up in 1 week(s).    Specialty:  Family Medicine    Why:  Pneumonia    Contact information:    [address] [id number] [phone]              Test Results Pending at Discharge:   Order Current Status    Blood Culture - Blood, Preliminary result    Blood Culture - Blood, Preliminary result           Cynthia Sanchez MD  02/02/18  4:11 PM    Time Spent on Discharge Activities: 45 minutes.  Medical record reviewed.  Discussed with patient and with nurse.             Electronically signed by Cynthia Sanchez MD at 2/2/2018  4:19 PM

## 2018-02-08 ENCOUNTER — OFFICE VISIT (OUTPATIENT)
Dept: FAMILY MEDICINE CLINIC | Facility: CLINIC | Age: 38
End: 2018-02-08

## 2018-02-08 VITALS
SYSTOLIC BLOOD PRESSURE: 116 MMHG | OXYGEN SATURATION: 99 % | BODY MASS INDEX: 24.11 KG/M2 | HEART RATE: 76 BPM | WEIGHT: 150 LBS | HEIGHT: 66 IN | RESPIRATION RATE: 18 BRPM | TEMPERATURE: 97.7 F | DIASTOLIC BLOOD PRESSURE: 66 MMHG

## 2018-02-08 DIAGNOSIS — J18.9 ATYPICAL PNEUMONIA: Primary | ICD-10-CM

## 2018-02-08 DIAGNOSIS — R10.9 FLANK PAIN: ICD-10-CM

## 2018-02-08 DIAGNOSIS — D64.9 ANEMIA, UNSPECIFIED TYPE: ICD-10-CM

## 2018-02-08 DIAGNOSIS — Z09 HOSPITAL DISCHARGE FOLLOW-UP: ICD-10-CM

## 2018-02-08 PROCEDURE — 99214 OFFICE O/P EST MOD 30 MIN: CPT | Performed by: NURSE PRACTITIONER

## 2018-02-08 NOTE — PROGRESS NOTES
Subjective   Dylan Blank is a 37 y.o. male.     History of Present Illness   Dylan Blank 37 y.o. male presents today for Emergency Room follow up.  he was treated [facility] for atypical pneumonia and testicular pain.  I reviewed all of the labs and diagnostic testing.    Current outpatient and discharge medications have been reconciled for the patient.  VINH Bell    he does not have a follow up appointment with a specialist:     Hospital note as follows:    Hospital Course:  The patient is a 37 year old Man admitted for Left flank and testicular pain with fever, leukopenia and thrombocytopenia.  CT abdomen neg for nephrolithiasis but shows mild bibasilar opacities. Testicular u/s essentially negative.  He was initially placed on azithromcyin and ceftriaxone for CAP.  He had high fever yesterday so antibiotics were changed to Zosyn and vancomycin.  He has defervesced.  CT of the chest ordered because d-dimer was extremely elevated and he has never had a CT.  It showed no pulmonary embolus but groundglass opacity in both upper lobes.  Patient very anxious to go home and requested discharge today.  I would prefer that he states at night but he has 3 kids at home and needs to be discharged.  Walking oximetry showed that he maintained his O2 sats, so he will not need home O2.  He will need close outpatient follow-up to make sure lung findings resolved completely.  Sinus tachycardia present.  Patient attributes to nicotine withdrawal.  Heart rate has improved today.  IV fluids were discontinued earlier.  Mild anemias present.  Outpatient follow-up    Patient reports feeling significant improvement since hospital discharge. He reports only mild occasional cough and chest congestion.  Reports his energy has not completely returned.  He denies fever or chills.  Testicular pain has improved but reports he still has some chronic left flank pain. He states he has had this since his vasectomy.  He has not  followed up with urology since. Denies any urinary symptoms.  Need to repeat CBC today and chest xray in 3 more weeks.   The following portions of the patient's history were reviewed and updated as appropriate: allergies, current medications, past family history, past medical history, past social history, past surgical history and problem list.    Review of Systems   Constitutional: Positive for fatigue. Negative for chills and fever.   HENT: Negative for congestion and sore throat.    Respiratory: Positive for cough. Negative for shortness of breath and wheezing.    Cardiovascular: Negative for chest pain and palpitations.   Genitourinary: Negative for dysuria, frequency, scrotal swelling, testicular pain and urgency.   Skin: Negative for rash.   Psychiatric/Behavioral: Negative for dysphoric mood and sleep disturbance. The patient is not nervous/anxious.        Objective   Physical Exam   Constitutional: He is oriented to person, place, and time. He appears well-developed and well-nourished.   Cardiovascular: Normal rate and regular rhythm.    Pulmonary/Chest: Effort normal and breath sounds normal.   Neurological: He is oriented to person, place, and time.   Skin: Skin is warm and dry.   Psychiatric: He has a normal mood and affect. His behavior is normal. Judgment and thought content normal.   Nursing note and vitals reviewed.      Assessment/Plan   Dylan was seen today for illness.    Diagnoses and all orders for this visit:    Atypical pneumonia  Comments:  improving    Anemia, unspecified type  -     CBC & Differential    Hospital discharge follow-up    Flank pain          Patient will f/u with [facility].

## 2018-02-09 LAB
BASOPHILS # BLD AUTO: 0.01 10*3/MM3 (ref 0–0.2)
BASOPHILS NFR BLD AUTO: 0.2 % (ref 0–1.5)
EOSINOPHIL # BLD AUTO: 0.03 10*3/MM3 (ref 0–0.7)
EOSINOPHIL NFR BLD AUTO: 0.6 % (ref 0.3–6.2)
ERYTHROCYTE [DISTWIDTH] IN BLOOD BY AUTOMATED COUNT: 12.7 % (ref 11.5–14.5)
HCT VFR BLD AUTO: 39.7 % (ref 40.4–52.2)
HGB BLD-MCNC: 12.8 G/DL (ref 13.7–17.6)
IMM GRANULOCYTES # BLD: 0.03 10*3/MM3 (ref 0–0.03)
IMM GRANULOCYTES NFR BLD: 0.6 % (ref 0–0.5)
LYMPHOCYTES # BLD AUTO: 1.36 10*3/MM3 (ref 0.9–4.8)
LYMPHOCYTES NFR BLD AUTO: 26.4 % (ref 19.6–45.3)
MCH RBC QN AUTO: 30.3 PG (ref 27–32.7)
MCHC RBC AUTO-ENTMCNC: 32.2 G/DL (ref 32.6–36.4)
MCV RBC AUTO: 94.1 FL (ref 79.8–96.2)
MONOCYTES # BLD AUTO: 0.67 10*3/MM3 (ref 0.2–1.2)
MONOCYTES NFR BLD AUTO: 13 % (ref 5–12)
NEUTROPHILS # BLD AUTO: 3.05 10*3/MM3 (ref 1.9–8.1)
NEUTROPHILS NFR BLD AUTO: 59.2 % (ref 42.7–76)
PLATELET # BLD AUTO: 526 10*3/MM3 (ref 140–500)
RBC # BLD AUTO: 4.22 10*6/MM3 (ref 4.6–6)
WBC # BLD AUTO: 5.15 10*3/MM3 (ref 4.5–10.7)

## 2018-07-10 ENCOUNTER — OFFICE VISIT (OUTPATIENT)
Dept: FAMILY MEDICINE CLINIC | Facility: CLINIC | Age: 38
End: 2018-07-10

## 2018-07-10 VITALS
DIASTOLIC BLOOD PRESSURE: 81 MMHG | HEIGHT: 66 IN | TEMPERATURE: 97.9 F | WEIGHT: 151 LBS | SYSTOLIC BLOOD PRESSURE: 136 MMHG | RESPIRATION RATE: 16 BRPM | BODY MASS INDEX: 24.27 KG/M2 | HEART RATE: 50 BPM | OXYGEN SATURATION: 98 %

## 2018-07-10 DIAGNOSIS — D49.2 ATYPICAL SQUAMOPROLIFERATIVE SKIN LESION: Primary | ICD-10-CM

## 2018-07-10 PROCEDURE — 99213 OFFICE O/P EST LOW 20 MIN: CPT | Performed by: NURSE PRACTITIONER

## 2018-07-10 NOTE — PROGRESS NOTES
Subjective   Dylan Blank is a 38 y.o. male.     History of Present Illness   Dylan Blank 38 y.o. male   Patient presents to office for skin lesion to right shoulder that has been present for about 6 weeks. States he noticed it after Memorial Day.  He was out on a boat all day and did not wear sunscreen. States he had significant sunburn with blistering and peeling of skin.  He states the skin lesion is raised and bleeds easily.  It is not painful.    The following portions of the patient's history were reviewed and updated as appropriate: allergies, current medications, past family history, past medical history, past social history, past surgical history and problem list.    Review of Systems   Constitutional: Negative for chills and fever.   Musculoskeletal: Negative for arthralgias and joint swelling.   Skin: Positive for wound. Negative for color change and rash.       Objective   Physical Exam   Constitutional: He is oriented to person, place, and time. He appears well-developed and well-nourished.   Pulmonary/Chest: Effort normal.   Neurological: He is oriented to person, place, and time.   Skin: Skin is warm and dry.        0.5 cm firm, red nodule to anterior right shoulder.    Psychiatric: He has a normal mood and affect. His behavior is normal. Judgment and thought content normal.   Nursing note and vitals reviewed.      Assessment/Plan   Dylan was seen today for sunburn.    Diagnoses and all orders for this visit:    Atypical squamoproliferative skin lesion  -     Ambulatory Referral to Dermatology

## 2019-03-14 ENCOUNTER — OFFICE VISIT (OUTPATIENT)
Dept: FAMILY MEDICINE CLINIC | Facility: CLINIC | Age: 39
End: 2019-03-14

## 2019-03-14 VITALS
OXYGEN SATURATION: 98 % | SYSTOLIC BLOOD PRESSURE: 140 MMHG | WEIGHT: 159 LBS | DIASTOLIC BLOOD PRESSURE: 80 MMHG | HEART RATE: 78 BPM | HEIGHT: 66 IN | TEMPERATURE: 98 F | BODY MASS INDEX: 25.55 KG/M2 | RESPIRATION RATE: 16 BRPM

## 2019-03-14 DIAGNOSIS — J02.9 SORE THROAT: ICD-10-CM

## 2019-03-14 DIAGNOSIS — J01.90 ACUTE SINUSITIS, RECURRENCE NOT SPECIFIED, UNSPECIFIED LOCATION: Primary | ICD-10-CM

## 2019-03-14 LAB
EXPIRATION DATE: NORMAL
INTERNAL CONTROL: NORMAL
Lab: NORMAL
S PYO AG THROAT QL: NEGATIVE

## 2019-03-14 PROCEDURE — 99213 OFFICE O/P EST LOW 20 MIN: CPT | Performed by: NURSE PRACTITIONER

## 2019-03-14 PROCEDURE — 87880 STREP A ASSAY W/OPTIC: CPT | Performed by: NURSE PRACTITIONER

## 2019-03-14 RX ORDER — AMOXICILLIN AND CLAVULANATE POTASSIUM 875; 125 MG/1; MG/1
1 TABLET, FILM COATED ORAL 2 TIMES DAILY
Qty: 20 TABLET | Refills: 0 | Status: SHIPPED | OUTPATIENT
Start: 2019-03-14 | End: 2019-03-24

## 2019-03-14 NOTE — PROGRESS NOTES
Subjective   Dylan Blank is a 38 y.o. male.     History of Present Illness   Dylan Blank 38 y.o. male who presents for evaluation of sore throat. Symptoms include congestion, sore throat, post nasal drip, headache, sinus pain, runny nose and pain while swallowing.  Onset of symptoms was 3 weeks ago, gradually worsening since that time. Patient denies shortness of breath, wheezing.   Evaluation to date: none Treatment to date:  Advil. States all 3 of his children had strep throat about a month ago.     The following portions of the patient's history were reviewed and updated as appropriate: allergies, current medications, past family history, past medical history, past social history, past surgical history and problem list.    Review of Systems   Constitutional: Positive for chills and fatigue. Negative for fever.   HENT: Positive for congestion, postnasal drip, sinus pressure, sore throat and trouble swallowing. Negative for ear pain, rhinorrhea and sinus pain.    Respiratory: Negative for cough, chest tightness, shortness of breath and wheezing.    Neurological: Positive for headaches.       Objective   Physical Exam   Constitutional: He is oriented to person, place, and time. He appears well-developed and well-nourished.   HENT:   Right Ear: Tympanic membrane, external ear and ear canal normal.   Left Ear: Tympanic membrane, external ear and ear canal normal.   Nose: Mucosal edema present. No rhinorrhea. Right sinus exhibits no maxillary sinus tenderness and no frontal sinus tenderness. Left sinus exhibits no maxillary sinus tenderness and no frontal sinus tenderness.   Mouth/Throat: Uvula is midline. Posterior oropharyngeal erythema present. No oropharyngeal exudate. No tonsillar exudate.   Cardiovascular: Normal rate and regular rhythm.   Pulmonary/Chest: Effort normal and breath sounds normal.   Neurological: He is oriented to person, place, and time.   Skin: Skin is warm and dry.   Psychiatric: He has  a normal mood and affect. His behavior is normal. Judgment and thought content normal.   Nursing note and vitals reviewed.      Assessment/Plan   Dylan was seen today for headache, sore throat and fatigue.    Diagnoses and all orders for this visit:    Acute sinusitis, recurrence not specified, unspecified location  -     amoxicillin-clavulanate (AUGMENTIN) 875-125 MG per tablet; Take 1 tablet by mouth 2 (Two) Times a Day for 10 days.    Sore throat  -     POCT rapid strep A

## 2020-08-19 ENCOUNTER — OFFICE VISIT (OUTPATIENT)
Dept: FAMILY MEDICINE CLINIC | Facility: CLINIC | Age: 40
End: 2020-08-19

## 2020-08-19 VITALS
HEIGHT: 66 IN | DIASTOLIC BLOOD PRESSURE: 60 MMHG | TEMPERATURE: 97.8 F | OXYGEN SATURATION: 98 % | SYSTOLIC BLOOD PRESSURE: 140 MMHG | WEIGHT: 148 LBS | HEART RATE: 54 BPM | RESPIRATION RATE: 16 BRPM | BODY MASS INDEX: 23.78 KG/M2

## 2020-08-19 DIAGNOSIS — S56.911A STRAIN OF RIGHT FOREARM, INITIAL ENCOUNTER: Primary | ICD-10-CM

## 2020-08-19 PROCEDURE — 99213 OFFICE O/P EST LOW 20 MIN: CPT | Performed by: NURSE PRACTITIONER

## 2020-08-19 RX ORDER — IBUPROFEN 800 MG/1
800 TABLET ORAL EVERY 8 HOURS PRN
Qty: 60 TABLET | Refills: 0 | Status: SHIPPED | OUTPATIENT
Start: 2020-08-19 | End: 2021-12-21

## 2020-08-19 NOTE — PROGRESS NOTES
Subjective   Dylan Blank is a 40 y.o. male.     History of Present Illness   Patient complains of right elbow pain. The symptoms began 2 days ago.  Pain is a result of lifting object and felt a pop. Pain is located medial elbow region. Discomfort is described as aching and soreness. Symptoms are exacerbated by pronation and supination .  Evaluation to date: none. Therapy to date includes: OTC NSAIDs        The following portions of the patient's history were reviewed and updated as appropriate: allergies, current medications, past family history, past medical history, past social history, past surgical history and problem list.    Review of Systems   Constitutional: Negative for fatigue.   Respiratory: Negative for cough and shortness of breath.    Cardiovascular: Negative for chest pain and palpitations.   Musculoskeletal: Positive for arthralgias. Negative for joint swelling.   Skin: Negative for rash.   Psychiatric/Behavioral: Negative for dysphoric mood and sleep disturbance. The patient is not nervous/anxious.        Objective   Physical Exam   Constitutional: He is oriented to person, place, and time. He appears well-developed and well-nourished.   Pulmonary/Chest: Effort normal.   Musculoskeletal:        Right elbow: He exhibits decreased range of motion. He exhibits no swelling, no effusion, no deformity and no laceration. Tenderness found. No radial head, no medial epicondyle, no lateral epicondyle and no olecranon process tenderness noted.        Arms:  Neurological: He is alert and oriented to person, place, and time.   Psychiatric: He has a normal mood and affect. His behavior is normal. Judgment and thought content normal.   Nursing note and vitals reviewed.      Assessment/Plan   Dylan was seen today for elbow pain.    Diagnoses and all orders for this visit:    Strain of right forearm, initial encounter  -     ibuprofen (ADVIL,MOTRIN) 800 MG tablet; Take 1 tablet by mouth Every 8 (Eight) Hours As  Needed for Mild Pain .        Patient to wear tennis elbow brace for at least 1 week and avoid lifting more than 5 lbs with right arm x 1 week.

## 2020-08-28 ENCOUNTER — OFFICE VISIT (OUTPATIENT)
Dept: FAMILY MEDICINE CLINIC | Facility: CLINIC | Age: 40
End: 2020-08-28

## 2020-08-28 VITALS
DIASTOLIC BLOOD PRESSURE: 80 MMHG | WEIGHT: 151 LBS | OXYGEN SATURATION: 96 % | SYSTOLIC BLOOD PRESSURE: 160 MMHG | HEART RATE: 74 BPM | TEMPERATURE: 97.3 F | HEIGHT: 66 IN | BODY MASS INDEX: 24.27 KG/M2 | RESPIRATION RATE: 16 BRPM

## 2020-08-28 DIAGNOSIS — S16.1XXD STRAIN OF NECK MUSCLE, SUBSEQUENT ENCOUNTER: Primary | ICD-10-CM

## 2020-08-28 DIAGNOSIS — Z09 HOSPITAL DISCHARGE FOLLOW-UP: ICD-10-CM
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
## 2020-08-28 DIAGNOSIS — S29.012D STRAIN OF LATISSIMUS DORSI MUSCLE, SUBSEQUENT ENCOUNTER: ICD-10-CM

## 2020-08-28 PROCEDURE — 99214 OFFICE O/P EST MOD 30 MIN: CPT | Performed by: NURSE PRACTITIONER
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
## 2020-10-09 ENCOUNTER — TELEMEDICINE (OUTPATIENT)
Dept: FAMILY MEDICINE CLINIC | Facility: CLINIC | Age: 40
End: 2020-10-09

## 2020-10-09 DIAGNOSIS — U07.1 COVID-19: Primary | ICD-10-CM

## 2020-10-09 PROCEDURE — 99213 OFFICE O/P EST LOW 20 MIN: CPT | Performed by: NURSE PRACTITIONER

## 2020-10-09 RX ORDER — DEXTROMETHORPHAN HYDROBROMIDE AND PROMETHAZINE HYDROCHLORIDE 15; 6.25 MG/5ML; MG/5ML
5 SYRUP ORAL 4 TIMES DAILY PRN
Qty: 200 ML | Refills: 0 | Status: SHIPPED | OUTPATIENT
Start: 2020-10-09 | End: 2020-10-19

## 2020-10-09 RX ORDER — AZITHROMYCIN 500 MG/1
500 TABLET, FILM COATED ORAL DAILY
Qty: 5 TABLET | Refills: 0 | Status: SHIPPED | OUTPATIENT
Start: 2020-10-09 | End: 2021-12-21

## 2020-10-09 NOTE — PROGRESS NOTES
Subjective   Dylan Blank is a 40 y.o. male.   You have chosen to receive care through a telehealth visit.  Do you consent to use a video/audio connection for your medical care today? Yes  History of Present Illness   Dylan Blank 40 y.o. male who presents for evaluation of cough. Symptoms include dry cough, headache and fatigue.  Onset of symptoms was 5 days ago, gradually improving since that time. Patient denies shortness of breath, wheezing, pleuritic chest pain, fever, dyspnea on exertion.   Evaluation to date: was seen at an  and tested positive for COVID 19 on Wednesday.  Treatment to date:  none.       The following portions of the patient's history were reviewed and updated as appropriate: allergies, current medications, past family history, past medical history, past social history, past surgical history and problem list.    Review of Systems   Constitutional: Positive for fatigue. Negative for chills and fever.   HENT: Negative for congestion, sinus pressure and sinus pain.    Respiratory: Positive for cough. Negative for chest tightness, shortness of breath and wheezing.    Cardiovascular: Negative for chest pain and palpitations.   Skin: Negative for rash.   Neurological: Positive for headaches.   Psychiatric/Behavioral: Negative for dysphoric mood and sleep disturbance. The patient is not nervous/anxious.        Objective   Physical Exam  Vitals signs and nursing note reviewed.   Constitutional:       Appearance: He is well-developed.   Cardiovascular:      Rate and Rhythm: Normal rate.   Pulmonary:      Effort: Pulmonary effort is normal.   Neurological:      Mental Status: He is oriented to person, place, and time.   Psychiatric:         Mood and Affect: Mood normal.         Behavior: Behavior normal.         Thought Content: Thought content normal.         Judgment: Judgment normal.         Assessment/Plan   Diagnoses and all orders for this visit:    COVID-19  -     azithromycin (Zithromax)  500 MG tablet; Take 1 tablet by mouth Daily.  -     promethazine-dextromethorphan (PROMETHAZINE-DM) 6.25-15 MG/5ML syrup; Take 5 mL by mouth 4 (Four) Times a Day As Needed for Cough for up to 10 days.              This visit has been rescheduled as a phone/video visit to comply with patient safety concerns in accordance with CDC recommendations. Total time of discussion was 15 minutes.

## 2021-01-06 ENCOUNTER — TELEMEDICINE (OUTPATIENT)
Dept: FAMILY MEDICINE CLINIC | Facility: CLINIC | Age: 41
End: 2021-01-06

## 2021-01-06 ENCOUNTER — TELEPHONE (OUTPATIENT)
Dept: FAMILY MEDICINE CLINIC | Facility: CLINIC | Age: 41
End: 2021-01-06

## 2021-01-06 DIAGNOSIS — R68.89 FLU-LIKE SYMPTOMS: Primary | ICD-10-CM

## 2021-01-06 DIAGNOSIS — R68.89 FLU-LIKE SYMPTOMS: ICD-10-CM

## 2021-01-06 PROCEDURE — 99213 OFFICE O/P EST LOW 20 MIN: CPT | Performed by: NURSE PRACTITIONER

## 2021-01-06 NOTE — TELEPHONE ENCOUNTER
Pharmacy Name: [facility], [place] - [address] [address] - [phone]  - [phone]    Pharmacy representative name: CARLO    Pharmacy representative phone number: [phone]    What medication are you calling in regards to: Baloxavir Marboxil,40 MG Dose, (XOFLUZA) 2 x 20 MG tablet therapy pack    What question does the pharmacy have: PHARMACY STATES THEY ARE UNABLE TO FILL THIS, WOULD LIKE TO KNOW IF IT CAN BE CHANGED TO TAMIFLU     Who is the provider that prescribed the medication: MURALI CABALLERO     Additional notes: PLEASE CALL AND ADVISE.

## 2021-01-06 NOTE — PROGRESS NOTES
Subjective   Dylan Blank is a 40 y.o. male.   You have chosen to receive care through a telehealth visit.  Do you consent to use a video/audio connection for your medical care today? Yes  History of Present Illness   Dylan Blank 40 y.o. male who presents for evaluation of flu like symptoms. Symptoms include congestion, sore throat, post nasal drip, productive cough, myalgias, sinus pain, chills and fatigue.  Onset of symptoms was 1 day ago, gradually worsening since that time. Patient denies shortness of breath, wheezing.   Evaluation to date: none Treatment to date:  Tylenol.   Patient recovered from COVID less than 2 months ago.       The following portions of the patient's history were reviewed and updated as appropriate: allergies, current medications, past family history, past medical history, past social history, past surgical history and problem list.    Review of Systems   Constitutional: Negative for fatigue.   Respiratory: Negative for cough and shortness of breath.    Cardiovascular: Negative for chest pain and palpitations.   Skin: Negative for rash.   Psychiatric/Behavioral: Negative for dysphoric mood and sleep disturbance. The patient is not nervous/anxious.        Objective   Physical Exam  Vitals signs and nursing note reviewed.   Constitutional:       Appearance: He is well-developed.   Pulmonary:      Effort: Pulmonary effort is normal.   Neurological:      Mental Status: He is oriented to person, place, and time.   Psychiatric:         Mood and Affect: Mood normal.         Behavior: Behavior normal.         Thought Content: Thought content normal.         Judgment: Judgment normal.         Assessment/Plan   Diagnoses and all orders for this visit:    1. Flu-like symptoms (Primary)  -     Baloxavir Marboxil,40 MG Dose, (XOFLUZA) 2 x 20 MG tablet therapy pack; Take 2 tablets by mouth 1 (One) Time for 1 dose.  Dispense: 1 each; Refill: 0        This visit has been rescheduled as a  phone/video visit to comply with patient safety concerns in accordance with CDC recommendations. Total time of discussion was 10 minutes.

## 2021-01-07 ENCOUNTER — TELEPHONE (OUTPATIENT)
Dept: FAMILY MEDICINE CLINIC | Facility: CLINIC | Age: 41
End: 2021-01-07

## 2021-01-07 RX ORDER — OSELTAMIVIR PHOSPHATE 75 MG/1
75 CAPSULE ORAL 2 TIMES DAILY
Qty: 10 CAPSULE | Refills: 0 | Status: SHIPPED | OUTPATIENT
Start: 2021-01-07 | End: 2021-12-21

## 2021-01-07 NOTE — TELEPHONE ENCOUNTER
----- Message from Nenita August MA sent at 1/7/2021 11:23 AM EST -----  Regarding: FW: Prescription Question  Contact: [phone]    ----- Message -----  From: Dylan Blank  Sent: 1/6/2021  12:03 PM EST  To: [facility]  Subject: Prescription Question                            The insurance would not cover the meds you prescribed, my pharmacy wants to change it to tamaflu. Can you please call [facility].    Thank you,

## 2021-01-07 NOTE — TELEPHONE ENCOUNTER
LM that rx was sent to [facility]. If [facility] does not have it he can have it trans to which pharm he would like by call that pharm and they will call [facility]

## 2021-04-02 ENCOUNTER — BULK ORDERING (OUTPATIENT)
Dept: CASE MANAGEMENT | Facility: OTHER | Age: 41
End: 2021-04-02

## 2021-04-02 DIAGNOSIS — Z23 IMMUNIZATION DUE: ICD-10-CM

## 2021-12-21 ENCOUNTER — OFFICE VISIT (OUTPATIENT)
Dept: FAMILY MEDICINE CLINIC | Facility: CLINIC | Age: 41
End: 2021-12-21

## 2021-12-21 VITALS
SYSTOLIC BLOOD PRESSURE: 140 MMHG | TEMPERATURE: 97.4 F | HEIGHT: 66 IN | OXYGEN SATURATION: 98 % | DIASTOLIC BLOOD PRESSURE: 80 MMHG | RESPIRATION RATE: 16 BRPM | HEART RATE: 79 BPM | WEIGHT: 150 LBS | BODY MASS INDEX: 24.11 KG/M2

## 2021-12-21 DIAGNOSIS — Z12.5 ENCOUNTER FOR PROSTATE CANCER SCREENING: ICD-10-CM

## 2021-12-21 DIAGNOSIS — Z00.00 LABORATORY EXAM ORDERED AS PART OF ROUTINE GENERAL MEDICAL EXAMINATION: ICD-10-CM

## 2021-12-21 DIAGNOSIS — R03.0 ELEVATED BLOOD PRESSURE READING WITHOUT DIAGNOSIS OF HYPERTENSION: Primary | ICD-10-CM

## 2021-12-21 PROCEDURE — 99213 OFFICE O/P EST LOW 20 MIN: CPT | Performed by: NURSE PRACTITIONER

## 2021-12-21 NOTE — PROGRESS NOTES
Subjective   Dylan Blank is a 41 y.o. male.     History of Present Illness   Answers for HPI/ROS submitted by the patient on 12/21/2021  What is the primary reason for your visit?: High Blood Pressure    Patient presents to office to discuss elevated BP reading.  He had high reading per EMS several days ago that was 160s/110s per patient.  He states he has not had issues with HTN before but had quit drinking ETOH 2 days and was going through detox.  He reports feeling well now.  He states it has been 9 days since he had his last drink.  He is motivated to avoid alcohol.  Bp is mildly elevated today.        Patient reports he also has had some recent stress which may be contributing.  He did not give much detail but declined need for any counseling or medication at this time.  He feels he is handling his stress well.  He would also like to quit smoking too, but is not ready at this time.    The following portions of the patient's history were reviewed and updated as appropriate: allergies, current medications, past family history, past medical history, past social history, past surgical history and problem list.    Review of Systems   Constitutional: Negative for unexpected weight change.   Respiratory: Negative for shortness of breath.    Cardiovascular: Positive for palpitations. Negative for chest pain.   Endocrine: Negative for cold intolerance, heat intolerance, polydipsia, polyphagia and polyuria.   Musculoskeletal: Positive for neck pain.   Neurological: Positive for headaches.   Psychiatric/Behavioral: Negative for self-injury and suicidal ideas. The patient is not nervous/anxious.        Objective   Physical Exam  Vitals and nursing note reviewed.   Constitutional:       Appearance: Normal appearance. He is well-developed.   Neck:      Vascular: No carotid bruit.   Cardiovascular:      Rate and Rhythm: Normal rate and regular rhythm.   Pulmonary:      Effort: Pulmonary effort is normal.      Breath  sounds: Normal breath sounds.   Neurological:      Mental Status: He is alert and oriented to person, place, and time.   Psychiatric:         Mood and Affect: Mood normal.         Behavior: Behavior normal.         Thought Content: Thought content normal.         Judgment: Judgment normal.         Assessment/Plan   Diagnoses and all orders for this visit:    1. Elevated blood pressure reading without diagnosis of hypertension (Primary)    2. Laboratory exam ordered as part of routine general medical examination  -     Comprehensive metabolic panel  -     Lipid panel  -     CBC and Differential  -     TSH  -     PSA Screen    3. Encounter for prostate cancer screening  -     PSA Screen      Elevated BP likely related to withdrawal from ETOH.  Will get labs today and monitor BP.

## 2021-12-30 LAB
ALBUMIN SERPL-MCNC: 4.7 G/DL (ref 4–5)
ALBUMIN/GLOB SERPL: 1.8 {RATIO} (ref 1.2–2.2)
ALP SERPL-CCNC: 67 IU/L (ref 44–121)
ALT SERPL-CCNC: 30 IU/L (ref 0–44)
AST SERPL-CCNC: 7 IU/L (ref 0–40)
BASOPHILS # BLD AUTO: 0 X10E3/UL (ref 0–0.2)
BASOPHILS NFR BLD AUTO: 1 %
BILIRUB SERPL-MCNC: 0.5 MG/DL (ref 0–1.2)
BUN SERPL-MCNC: 11 MG/DL (ref 6–24)
BUN/CREAT SERPL: 12 (ref 9–20)
CALCIUM SERPL-MCNC: 9.4 MG/DL (ref 8.7–10.2)
CHLORIDE SERPL-SCNC: 102 MMOL/L (ref 96–106)
CHOLEST SERPL-MCNC: 281 MG/DL (ref 100–199)
CO2 SERPL-SCNC: 24 MMOL/L (ref 20–29)
CREAT SERPL-MCNC: 0.94 MG/DL (ref 0.76–1.27)
EOSINOPHIL # BLD AUTO: 0.1 X10E3/UL (ref 0–0.4)
EOSINOPHIL NFR BLD AUTO: 1 %
ERYTHROCYTE [DISTWIDTH] IN BLOOD BY AUTOMATED COUNT: 12.7 % (ref 11.6–15.4)
GLOBULIN SER CALC-MCNC: 2.6 G/DL (ref 1.5–4.5)
GLUCOSE SERPL-MCNC: 123 MG/DL (ref 65–99)
HCT VFR BLD AUTO: 49.7 % (ref 37.5–51)
HDLC SERPL-MCNC: 45 MG/DL
HGB BLD-MCNC: 16.8 G/DL (ref 13–17.7)
IMM GRANULOCYTES # BLD AUTO: 0 X10E3/UL (ref 0–0.1)
IMM GRANULOCYTES NFR BLD AUTO: 0 %
LDLC SERPL CALC-MCNC: 166 MG/DL (ref 0–99)
LYMPHOCYTES # BLD AUTO: 1.2 X10E3/UL (ref 0.7–3.1)
LYMPHOCYTES NFR BLD AUTO: 16 %
MCH RBC QN AUTO: 31.5 PG (ref 26.6–33)
MCHC RBC AUTO-ENTMCNC: 33.8 G/DL (ref 31.5–35.7)
MCV RBC AUTO: 93 FL (ref 79–97)
MONOCYTES # BLD AUTO: 0.4 X10E3/UL (ref 0.1–0.9)
MONOCYTES NFR BLD AUTO: 6 %
NEUTROPHILS # BLD AUTO: 5.9 X10E3/UL (ref 1.4–7)
NEUTROPHILS NFR BLD AUTO: 76 %
PLATELET # BLD AUTO: 247 X10E3/UL (ref 150–450)
POTASSIUM SERPL-SCNC: 4.5 MMOL/L (ref 3.5–5.2)
PROT SERPL-MCNC: 7.3 G/DL (ref 6–8.5)
PSA SERPL-MCNC: 1.2 NG/ML (ref 0–4)
RBC # BLD AUTO: 5.34 X10E6/UL (ref 4.14–5.8)
SODIUM SERPL-SCNC: 142 MMOL/L (ref 134–144)
TRIGL SERPL-MCNC: 366 MG/DL (ref 0–149)
TSH SERPL DL<=0.005 MIU/L-ACNC: 1.2 UIU/ML (ref 0.45–4.5)
VLDLC SERPL CALC-MCNC: 70 MG/DL (ref 5–40)
WBC # BLD AUTO: 7.7 X10E3/UL (ref 3.4–10.8)

## 2022-01-11 ENCOUNTER — TELEPHONE (OUTPATIENT)
Dept: FAMILY MEDICINE CLINIC | Facility: CLINIC | Age: 42
End: 2022-01-11

## 2022-01-11 DIAGNOSIS — R73.9 BLOOD GLUCOSE ELEVATED: Primary | ICD-10-CM

## 2022-03-10 ENCOUNTER — OFFICE VISIT (OUTPATIENT)
Dept: FAMILY MEDICINE CLINIC | Facility: CLINIC | Age: 42
End: 2022-03-10

## 2022-03-10 VITALS
OXYGEN SATURATION: 100 % | TEMPERATURE: 97.5 F | SYSTOLIC BLOOD PRESSURE: 118 MMHG | WEIGHT: 157 LBS | BODY MASS INDEX: 25.23 KG/M2 | HEIGHT: 66 IN | HEART RATE: 78 BPM | DIASTOLIC BLOOD PRESSURE: 70 MMHG

## 2022-03-10 DIAGNOSIS — H61.23 BILATERAL IMPACTED CERUMEN: ICD-10-CM

## 2022-03-10 DIAGNOSIS — H66.003 NON-RECURRENT ACUTE SUPPURATIVE OTITIS MEDIA OF BOTH EARS WITHOUT SPONTANEOUS RUPTURE OF TYMPANIC MEMBRANES: Primary | ICD-10-CM

## 2022-03-10 DIAGNOSIS — J01.00 ACUTE NON-RECURRENT MAXILLARY SINUSITIS: ICD-10-CM

## 2022-03-10 PROCEDURE — 69209 REMOVE IMPACTED EAR WAX UNI: CPT | Performed by: NURSE PRACTITIONER

## 2022-03-10 PROCEDURE — 99213 OFFICE O/P EST LOW 20 MIN: CPT | Performed by: NURSE PRACTITIONER

## 2022-03-10 RX ORDER — ALPRAZOLAM 0.25 MG/1
0.25 TABLET ORAL EVERY 6 HOURS PRN
COMMUNITY
Start: 2022-03-04

## 2022-03-10 RX ORDER — AMOXICILLIN AND CLAVULANATE POTASSIUM 875; 125 MG/1; MG/1
1 TABLET, FILM COATED ORAL EVERY 12 HOURS SCHEDULED
Qty: 20 TABLET | Refills: 0 | Status: SHIPPED | OUTPATIENT
Start: 2022-03-10 | End: 2022-05-11

## 2022-03-10 NOTE — PROGRESS NOTES
"Chief Complaint  Tinnitus (/RIGHT EAR) and Earache (Pain R>L/No drainage )    Subjective          Dylan presents to [facility]    41 year old male presented to the clinic to discuss some ongoing ear pain he has had increasing over the past month.  States that he has had a problem with eat wax and he has been digging in his ear and trying qtips and his finger to try to get the wax out.  States that he has had pressure and decreased hearing for the past month from the wax.  He has had a few times that he has had buzzing sound in his ears.      He insists on having his ears flushed today.      No fever or drainage from his ears.  He is having some sinus pain and pressure the last week also.  Some thick postnasal drainage and congestion.  He denies any cough, sob, no abd pain.  NO N/V/D    He has no other c/o today          Review of Systems   Constitutional: Negative for chills, fatigue and fever.   HENT: Positive for congestion, ear pain, postnasal drip, sinus pressure and tinnitus.    Eyes: Negative for visual disturbance.   Respiratory: Negative for cough and shortness of breath.    Cardiovascular: Negative for chest pain, palpitations and leg swelling.   Gastrointestinal: Negative for abdominal pain, diarrhea, nausea and vomiting.   Neurological: Negative for dizziness and light-headedness.        Objective   Vital Signs:   Vitals:    03/10/22 1132   BP: 118/70   Pulse: 78   Temp: 97.5 °F (36.4 °C)   SpO2: 100%   Weight: 71.2 kg (157 lb)   Height: 167 cm (65.75\")        Physical Exam  Vitals reviewed.   Constitutional:       Appearance: Normal appearance. He is not ill-appearing.   HENT:      Head: Normocephalic.      Right Ear: External ear normal. Decreased hearing noted. Tenderness present. A middle ear effusion is present. There is impacted cerumen. Tympanic membrane is erythematous.      Left Ear: External ear normal. Decreased hearing noted. A middle ear effusion is present. " There is impacted cerumen. Tympanic membrane is erythematous.      Nose: Congestion present.      Right Turbinates: Swollen.      Left Turbinates: Swollen.      Right Sinus: Maxillary sinus tenderness present.      Left Sinus: Maxillary sinus tenderness present.   Cardiovascular:      Rate and Rhythm: Normal rate and regular rhythm.      Pulses: Normal pulses.      Heart sounds: Normal heart sounds. No murmur heard.  Pulmonary:      Effort: Pulmonary effort is normal.      Breath sounds: Normal breath sounds.   Skin:     General: Skin is warm.   Neurological:      Mental Status: He is alert and oriented to person, place, and time.   Psychiatric:         Mood and Affect: Mood normal.         Behavior: Behavior normal.         Thought Content: Thought content normal.         Judgment: Judgment normal.          Result Review :     The following data was reviewed by: VINH Ellis on 03/10/2022:    Ear Cerumen Removal    Date/Time: 3/10/2022 1:22 PM  Performed by: Andie Mejia APRN  Authorized by: Andie Mejia APRN   Consent: Verbal consent obtained.  Risks and benefits: risks, benefits and alternatives were discussed  Consent given by: patient  Patient understanding: patient states understanding of the procedure being performed  Patient consent: the patient's understanding of the procedure matches consent given  Patient identity confirmed: verbally with patient    Anesthesia:  Local Anesthetic: none  Location details: left ear and right ear  Patient tolerance: patient tolerated the procedure well with no immediate complications  Procedure type: irrigation   Sedation:  Patient sedated: no              Assessment and Plan    Diagnoses and all orders for this visit:    1. Non-recurrent acute suppurative otitis media of both ears without spontaneous rupture of tympanic membranes (Primary)  -     amoxicillin-clavulanate (Augmentin) 875-125 MG per tablet; Take 1 tablet by mouth Every 12 (Twelve) Hours.  One PO BID for infection with food  Dispense: 20 tablet; Refill: 0    2. Acute non-recurrent maxillary sinusitis  -     amoxicillin-clavulanate (Augmentin) 875-125 MG per tablet; Take 1 tablet by mouth Every 12 (Twelve) Hours. One PO BID for infection with food  Dispense: 20 tablet; Refill: 0    3. Bilateral impacted cerumen  -     Cerumen Removal      Augmentin was sent to the patient's pharmacy   - Flonase nasal spray as needed-  Increase fluid intake  -Take all medications as prescribed and until completed.  -Monitor for fever and take Tylenol as needed.  Drink plenty of fluids and get plenty of rest.  -Use cool-mist humidifier  -Seek immediate medical attention for fever unrelieved by Tylenol, chest pain, shortness of breath, sharp back pain, or any other worsening signs or symptoms.  -The patient verbalized understanding of all instructions given today.    Follow Up   Return if symptoms worsen or fail to improve.  Patient was given instructions and counseling regarding his condition or for health maintenance advice. Please see specific information pulled into the AVS if appropriate.

## 2022-05-11 ENCOUNTER — OFFICE VISIT (OUTPATIENT)
Dept: FAMILY MEDICINE CLINIC | Facility: CLINIC | Age: 42
End: 2022-05-11

## 2022-05-11 VITALS
HEART RATE: 80 BPM | RESPIRATION RATE: 18 BRPM | HEIGHT: 66 IN | OXYGEN SATURATION: 97 % | TEMPERATURE: 97.5 F | DIASTOLIC BLOOD PRESSURE: 84 MMHG | WEIGHT: 153 LBS | SYSTOLIC BLOOD PRESSURE: 124 MMHG | BODY MASS INDEX: 24.59 KG/M2

## 2022-05-11 DIAGNOSIS — H10.9 BACTERIAL CONJUNCTIVITIS: Primary | ICD-10-CM

## 2022-05-11 DIAGNOSIS — J30.2 SEASONAL ALLERGIES: ICD-10-CM

## 2022-05-11 PROCEDURE — 99213 OFFICE O/P EST LOW 20 MIN: CPT | Performed by: NURSE PRACTITIONER

## 2022-05-11 RX ORDER — POLYMYXIN B SULFATE AND TRIMETHOPRIM 1; 10000 MG/ML; [USP'U]/ML
1 SOLUTION OPHTHALMIC EVERY 4 HOURS
Qty: 10 ML | Refills: 0 | Status: SHIPPED | OUTPATIENT
Start: 2022-05-11 | End: 2022-05-18

## 2022-05-11 NOTE — PROGRESS NOTES
"Chief Complaint  Conjunctivitis (Sticky burning eye 1 week) and Headache    Subjective          Dylan presents to [facility]    42 year old male presented to the clinic c/o right eye redness, purulent drainage/ matted eyes and pain for the past several days that have worsened.  Originally his eye felt \"grainy\" and now is burning and very irritated.   He has a mild intermittent ha.  NO other cough or could like symptoms.  He does have seasonal allergies, and he has been working in the yard and around dirt and narinder.      B/p in office today 124/84.    He has no other c/o today    The following portions of the patient's history were reviewed and updated as appropriate: allergies, current medications, past family history, past medical history, past social history, past surgical history, and problem list    Review of Systems   Constitutional: Negative for chills, fatigue and fever.   Eyes: Positive for pain, discharge and redness. Negative for itching and visual disturbance.   Respiratory: Negative for cough and shortness of breath.    Cardiovascular: Negative for chest pain, palpitations and leg swelling.   Neurological: Negative for dizziness and light-headedness.        Objective   Vital Signs:   Vitals:    05/11/22 1358   BP: 124/84   Pulse: 80   Resp: 18   Temp: 97.5 °F (36.4 °C)   SpO2: 97%   Weight: 69.4 kg (153 lb)   Height: 167 cm (65.75\")        BMI is within normal parameters. No follow-up required.       Physical Exam  Vitals reviewed.   Constitutional:       Appearance: Normal appearance. He is not ill-appearing.   HENT:      Head: Normocephalic.      Nose: Nose normal. No congestion.      Mouth/Throat:      Mouth: Mucous membranes are moist.      Pharynx: Oropharynx is clear.   Eyes:      General: Lids are normal. Lids are everted, no foreign bodies appreciated. Vision grossly intact. Gaze aligned appropriately.         Right eye: Discharge present.      Conjunctiva/sclera:     "  Right eye: Right conjunctiva is injected. Exudate present.   Neck:      Vascular: No carotid bruit.   Cardiovascular:      Rate and Rhythm: Normal rate and regular rhythm.      Pulses: Normal pulses.      Heart sounds: Normal heart sounds. No murmur heard.  Pulmonary:      Effort: Pulmonary effort is normal.      Breath sounds: Normal breath sounds.   Skin:     General: Skin is warm.   Neurological:      Mental Status: He is alert and oriented to person, place, and time.   Psychiatric:         Mood and Affect: Mood normal.         Behavior: Behavior normal.         Thought Content: Thought content normal.         Judgment: Judgment normal.          Result Review :     The following data was reviewed by: VINH Ellis on 05/11/2022:           Assessment and Plan    Diagnoses and all orders for this visit:    1. Bacterial conjunctivitis (Primary)  -     trimethoprim-polymyxin b (Polytrim) 02323-3.1 UNIT/ML-% ophthalmic solution; Administer 1 drop to both eyes Every 4 (Four) Hours for 7 days.  Dispense: 10 mL; Refill: 0    2. Seasonal allergies  Comments:  recommend daily allergy medication    · Do not wear contacts until the infection is gone. Wear glasses until your doctor says it is okay to wear contacts again.  · Do not wear eye makeup until the infection is gone. Throw away old eye makeup.  · Change or wash your pillowcase every day.  · Do not share towels or washcloths.  · Wash your hands often with soap and water. Use paper towels to dry your hands.  · Do not touch or rub your eyes.  · Do not drive or use heavy machinery if your vision is blurred.      Follow Up   Return if symptoms worsen or fail to improve.  Patient was given instructions and counseling regarding his condition or for health maintenance advice. Please see specific information pulled into the AVS if appropriate.

## 2022-06-27 ENCOUNTER — OFFICE VISIT (OUTPATIENT)
Dept: FAMILY MEDICINE CLINIC | Facility: CLINIC | Age: 42
End: 2022-06-27

## 2022-06-27 VITALS
BODY MASS INDEX: 24.43 KG/M2 | SYSTOLIC BLOOD PRESSURE: 140 MMHG | HEART RATE: 68 BPM | HEIGHT: 66 IN | TEMPERATURE: 97.5 F | DIASTOLIC BLOOD PRESSURE: 80 MMHG | WEIGHT: 152 LBS | RESPIRATION RATE: 16 BRPM | OXYGEN SATURATION: 98 %

## 2022-06-27 DIAGNOSIS — G89.29 CHRONIC NECK PAIN: ICD-10-CM

## 2022-06-27 DIAGNOSIS — M54.42 CHRONIC MIDLINE LOW BACK PAIN WITH BILATERAL SCIATICA: ICD-10-CM

## 2022-06-27 DIAGNOSIS — G89.29 CHRONIC MIDLINE LOW BACK PAIN WITH BILATERAL SCIATICA: ICD-10-CM

## 2022-06-27 DIAGNOSIS — S16.1XXA STRAIN OF NECK MUSCLE, INITIAL ENCOUNTER: Primary | ICD-10-CM

## 2022-06-27 DIAGNOSIS — M54.41 CHRONIC MIDLINE LOW BACK PAIN WITH BILATERAL SCIATICA: ICD-10-CM

## 2022-06-27 DIAGNOSIS — M54.2 CHRONIC NECK PAIN: ICD-10-CM

## 2022-06-27 PROCEDURE — 99213 OFFICE O/P EST LOW 20 MIN: CPT | Performed by: NURSE PRACTITIONER

## 2022-06-27 RX ORDER — PREDNISONE 20 MG/1
TABLET ORAL
Qty: 20 TABLET | Refills: 0 | Status: SHIPPED | OUTPATIENT
Start: 2022-06-27 | End: 2022-12-29

## 2022-06-27 RX ORDER — METAXALONE 800 MG/1
800 TABLET ORAL 3 TIMES DAILY PRN
Qty: 30 TABLET | Refills: 0 | Status: SHIPPED | OUTPATIENT
Start: 2022-06-27 | End: 2022-12-29

## 2022-06-27 NOTE — PROGRESS NOTES
Subjective   Dylan Blank is a 42 y.o. male.     History of Present Illness    Neck Pain (X 2 weeks or more, unable to sleep, stiff neck, hurts to turn to the left.  Long history of neck and back pain.) No known injury.     Had normal CT cervical spine in 2020.     Reports chronic neck pain intermittently for the past 5 years.      He has also had low back pain for several years that is constant but varies in intensity.  He reports pain across his low back that will radiate into his legs at times.  He would like to see pain management for this to discuss options.    The following portions of the patient's history were reviewed and updated as appropriate: allergies, current medications, past family history, past medical history, past social history, past surgical history and problem list.    Review of Systems   Constitutional: Negative for fatigue.   Respiratory: Negative for cough and shortness of breath.    Cardiovascular: Negative for chest pain and palpitations.   Musculoskeletal: Positive for neck pain and neck stiffness.   Skin: Negative for rash.   Psychiatric/Behavioral: Negative for dysphoric mood and sleep disturbance. The patient is not nervous/anxious.        Objective   Physical Exam  Vitals and nursing note reviewed.   Constitutional:       Appearance: Normal appearance. He is well-developed.   Cardiovascular:      Rate and Rhythm: Normal rate.   Pulmonary:      Effort: Pulmonary effort is normal.   Musculoskeletal:      Cervical back: Pain with movement and muscular tenderness present. No spinous process tenderness. Decreased range of motion.   Neurological:      Mental Status: He is oriented to person, place, and time.   Psychiatric:         Mood and Affect: Mood normal.         Behavior: Behavior normal.         Thought Content: Thought content normal.         Judgment: Judgment normal.         Assessment & Plan   Diagnoses and all orders for this visit:    1. Strain of neck muscle, initial  encounter (Primary)  -     metaxalone (Skelaxin) 800 MG tablet; Take 1 tablet by mouth 3 (Three) Times a Day As Needed for Muscle Spasms.  Dispense: 30 tablet; Refill: 0  -     predniSONE (DELTASONE) 20 MG tablet; Take 3 tabs QDPC x 3 days then 2 tabs QDPC x 4 days then 1 tab QDPC x 3 days  Dispense: 20 tablet; Refill: 0    2. Chronic neck pain  -     predniSONE (DELTASONE) 20 MG tablet; Take 3 tabs QDPC x 3 days then 2 tabs QDPC x 4 days then 1 tab QDPC x 3 days  Dispense: 20 tablet; Refill: 0  -     Ambulatory Referral to Pain Management    3. Chronic midline low back pain with bilateral sciatica  -     predniSONE (DELTASONE) 20 MG tablet; Take 3 tabs QDPC x 3 days then 2 tabs QDPC x 4 days then 1 tab QDPC x 3 days  Dispense: 20 tablet; Refill: 0  -     Ambulatory Referral to Pain Management

## 2022-12-29 ENCOUNTER — TELEPHONE (OUTPATIENT)
Dept: FAMILY MEDICINE CLINIC | Facility: CLINIC | Age: 42
End: 2022-12-29

## 2022-12-29 ENCOUNTER — OFFICE VISIT (OUTPATIENT)
Dept: FAMILY MEDICINE CLINIC | Facility: CLINIC | Age: 42
End: 2022-12-29

## 2022-12-29 VITALS
DIASTOLIC BLOOD PRESSURE: 77 MMHG | SYSTOLIC BLOOD PRESSURE: 118 MMHG | RESPIRATION RATE: 16 BRPM | TEMPERATURE: 97.6 F | WEIGHT: 152 LBS | OXYGEN SATURATION: 99 % | HEART RATE: 85 BPM | BODY MASS INDEX: 24.43 KG/M2 | HEIGHT: 66 IN

## 2022-12-29 DIAGNOSIS — J01.00 ACUTE NON-RECURRENT MAXILLARY SINUSITIS: Primary | ICD-10-CM

## 2022-12-29 DIAGNOSIS — R09.81 COUGH WITH CONGESTION OF PARANASAL SINUS: ICD-10-CM

## 2022-12-29 DIAGNOSIS — R05.8 COUGH WITH CONGESTION OF PARANASAL SINUS: ICD-10-CM

## 2022-12-29 PROCEDURE — 99213 OFFICE O/P EST LOW 20 MIN: CPT | Performed by: NURSE PRACTITIONER

## 2022-12-29 RX ORDER — DEXTROMETHORPHAN HYDROBROMIDE AND PROMETHAZINE HYDROCHLORIDE 15; 6.25 MG/5ML; MG/5ML
5 SYRUP ORAL 4 TIMES DAILY PRN
Qty: 118 ML | Refills: 0 | Status: SHIPPED | OUTPATIENT
Start: 2022-12-29

## 2022-12-29 RX ORDER — PREDNISONE 20 MG/1
TABLET ORAL
Qty: 10 TABLET | Refills: 0 | Status: SHIPPED | OUTPATIENT
Start: 2022-12-29

## 2022-12-29 RX ORDER — HYDROCODONE BITARTRATE AND ACETAMINOPHEN 5; 325 MG/1; MG/1
1 TABLET ORAL 2 TIMES DAILY
COMMUNITY
Start: 2022-11-05

## 2022-12-29 RX ORDER — AMOXICILLIN AND CLAVULANATE POTASSIUM 875; 125 MG/1; MG/1
1 TABLET, FILM COATED ORAL EVERY 12 HOURS SCHEDULED
Qty: 20 TABLET | Refills: 0 | Status: SHIPPED | OUTPATIENT
Start: 2022-12-29 | End: 2023-01-05 | Stop reason: ALTCHOICE

## 2022-12-29 NOTE — PROGRESS NOTES
"Chief Complaint  Nasal Congestion, Sore Throat, and Cough    Sejal Richardson presents to [facility]     As a 42 year old male C/o a 2 week history of sinus pain and pressure, nasal congestion, post nasal drainage, and non productive cough.  He denies any sob or wheezing.  No fever, abdominal pain, no N/V/D.  He feels that his congestion has worsened and he is having thick yellow green nasal drainage and facial tenderness.  He is taking otc cold and cough suppressant with minimal symptom relief.  He has had no previous treatment for this C/o.  His wife is sick with similar symptoms and was tested negative for Flu and covid and has a sinus infection.    The following portions of the patient's history were reviewed and updated as appropriate: allergies, current medications, past family history, past medical history, past social history, past surgical history, and problem list    Review of Systems   Constitutional: Positive for fatigue. Negative for chills and fever.   HENT: Positive for congestion, postnasal drip, sinus pressure and sore throat.    Eyes: Negative for visual disturbance.   Respiratory: Positive for cough. Negative for shortness of breath and wheezing.    Cardiovascular: Negative for chest pain, palpitations and leg swelling.   Gastrointestinal: Negative for abdominal pain, diarrhea, nausea and vomiting.   Skin: Negative for rash.   Neurological: Negative for dizziness and light-headedness.        Objective   Vital Signs:   Vitals:    12/29/22 1016   BP: 118/77   Pulse: 85   Resp: 16   Temp: 97.6 °F (36.4 °C)   TempSrc: Skin   SpO2: 99%   Weight: 68.9 kg (152 lb)   Height: 167 cm (65.75\")        BMI is within normal parameters. No other follow-up for BMI required.        Physical Exam  Vitals reviewed.   Constitutional:       General: He is not in acute distress.  HENT:      Right Ear: A middle ear effusion is present. Tympanic membrane is not erythematous or " bulging.      Left Ear: A middle ear effusion is present. Tympanic membrane is not erythematous or bulging.      Nose: Congestion present.      Right Turbinates: Swollen.      Left Turbinates: Swollen.      Right Sinus: Maxillary sinus tenderness present.      Left Sinus: Maxillary sinus tenderness present.      Mouth/Throat:      Mouth: Mucous membranes are moist.      Pharynx: Oropharynx is clear. No oropharyngeal exudate or posterior oropharyngeal erythema.      Tonsils: No tonsillar exudate or tonsillar abscesses. 0 on the right. 0 on the left.   Eyes:      Conjunctiva/sclera: Conjunctivae normal.   Neck:      Thyroid: No thyromegaly.      Vascular: No carotid bruit.   Cardiovascular:      Rate and Rhythm: Normal rate and regular rhythm.      Heart sounds: Normal heart sounds.   Pulmonary:      Effort: Pulmonary effort is normal. No respiratory distress.      Breath sounds: Normal breath sounds. No stridor. No wheezing, rhonchi or rales.   Chest:      Chest wall: No tenderness.   Neurological:      Mental Status: He is alert.   Psychiatric:         Attention and Perception: Attention normal.         Mood and Affect: Mood normal.          Result Review :     The following data was reviewed by: VINH Ellis on 12/29/2022:           Assessment and Plan    Diagnoses and all orders for this visit:    1. Acute non-recurrent maxillary sinusitis (Primary)  -     amoxicillin-clavulanate (Augmentin) 875-125 MG per tablet; Take 1 tablet by mouth Every 12 (Twelve) Hours. One PO BID for infection with food  Dispense: 20 tablet; Refill: 0  -     predniSONE (DELTASONE) 20 MG tablet; One PO BID with food for 5days  Dispense: 10 tablet; Refill: 0    2. Cough with congestion of paranasal sinus  Comments:  Follow up with any worsening symptoms or no improvments  Orders:  -     amoxicillin-clavulanate (Augmentin) 875-125 MG per tablet; Take 1 tablet by mouth Every 12 (Twelve) Hours. One PO BID for infection with food   Dispense: 20 tablet; Refill: 0  -     predniSONE (DELTASONE) 20 MG tablet; One PO BID with food for 5days  Dispense: 10 tablet; Refill: 0  -     promethazine-dextromethorphan (PROMETHAZINE-DM) 6.25-15 MG/5ML syrup; Take 5 mL by mouth 4 (Four) Times a Day As Needed for Cough.  Dispense: 118 mL; Refill: 0    Augmentin  was sent to the patient's pharmacy  -Flonase nasal spray as needed  -Take all medications as prescribed and until completed.  -Monitor for fever and take Tylenol as needed.  Drink plenty of fluids and get plenty of rest.  -Use cool-mist humidifier  -Seek immediate medical attention for fever unrelieved by Tylenol, chest pain, shortness of breath, sharp back pain, or any other worsening signs or symptoms.  -The patient verbalized understanding of all instructions given today.    Follow Up   Return if symptoms worsen or fail to improve.  Patient was given instructions and counseling regarding his condition or for health maintenance advice. Please see specific information pulled into the AVS if appropriate.

## 2022-12-29 NOTE — TELEPHONE ENCOUNTER
Caller: Dylan Blank    Relationship: Self    Best call back number: [phone]    What was the call regarding: PATIENT HAD AN APPOINTMENT TODAY 12/29/22 AND NEEDS A NOTE FOR THIS WORK.  PLEASE ATTACH THE NOTE IN MY CHART.    Do you require a callback: YES

## 2023-01-05 ENCOUNTER — OFFICE VISIT (OUTPATIENT)
Dept: FAMILY MEDICINE CLINIC | Facility: CLINIC | Age: 43
End: 2023-01-05
Payer: COMMERCIAL

## 2023-01-05 VITALS
HEART RATE: 73 BPM | TEMPERATURE: 97.9 F | HEIGHT: 66 IN | DIASTOLIC BLOOD PRESSURE: 98 MMHG | RESPIRATION RATE: 16 BRPM | OXYGEN SATURATION: 98 % | WEIGHT: 153 LBS | BODY MASS INDEX: 24.59 KG/M2 | SYSTOLIC BLOOD PRESSURE: 138 MMHG

## 2023-01-05 DIAGNOSIS — R53.83 OTHER FATIGUE: ICD-10-CM

## 2023-01-05 DIAGNOSIS — J01.10 ACUTE FRONTAL SINUSITIS, RECURRENCE NOT SPECIFIED: Primary | ICD-10-CM

## 2023-01-05 DIAGNOSIS — R52 BODY ACHES: ICD-10-CM

## 2023-01-05 DIAGNOSIS — J02.9 SORE THROAT: ICD-10-CM

## 2023-01-05 DIAGNOSIS — R51.9 NONINTRACTABLE HEADACHE, UNSPECIFIED CHRONICITY PATTERN, UNSPECIFIED HEADACHE TYPE: ICD-10-CM

## 2023-01-05 LAB
EXPIRATION DATE: NORMAL
FLUAV AG UPPER RESP QL IA.RAPID: NOT DETECTED
FLUBV AG UPPER RESP QL IA.RAPID: NOT DETECTED
INTERNAL CONTROL: NORMAL
Lab: NORMAL
SARS-COV-2 AG UPPER RESP QL IA.RAPID: NOT DETECTED

## 2023-01-05 PROCEDURE — 87428 SARSCOV & INF VIR A&B AG IA: CPT

## 2023-01-05 PROCEDURE — 99214 OFFICE O/P EST MOD 30 MIN: CPT

## 2023-01-05 RX ORDER — CEFDINIR 300 MG/1
300 CAPSULE ORAL 2 TIMES DAILY
Qty: 20 CAPSULE | Refills: 0 | Status: SHIPPED | OUTPATIENT
Start: 2023-01-05 | End: 2023-01-15

## 2023-01-05 NOTE — LETTER
January 5, 2023     Patient: Dylan Blank   YOB: 1980   Date of Visit: 1/5/2023       To Whom It May Concern:    Dylan Blank was seen in our office today and needs to be excused from group therapy on 1/5/2023.           Sincerely,        VINH Escobar    CC: No Recipients

## 2023-01-05 NOTE — PROGRESS NOTES
Chief Complaint  Headache, Sore Throat, Generalized Body Aches, and Fatigue    Subjective         History of Present Illness    Dylan Blank 42 y.o. male presents for evaluation of sore throat, fatigue. Symptoms include sore throat, myalgias, headache, and fatigue.  Onset of symptoms was 1 month ago, improves and then worsens since that time. Patient denies shortness of breath, wheezing, upper respiratory congestion, hemoptysis, pleuritic chest pain, dyspnea on exertion, ear drainage, eye discharge, diarrhea, vomiting, vertigo, sneezing, chills, sweats, epistaxis, high fever, joint pain.   Evaluation to date: was seen recently here.  Treatment to date:  oral prednisone, antibiotics from prior visit and Promethazine DM.     The patient was prescribed Augmentin 875-125 mg twice daily for 10 days, Prednisone 20 mg twice daily for 5 days, and Promethazine-DM for acute maxillary sinusitis and cough on 12/29/2022.    He is still taking Augmentin.  He took the Prednisone and Promethazine-DM for one day.  The cough has resolved.    Review of Systems   Constitutional: Positive for fatigue. Negative for appetite change, chills and fever.   HENT: Positive for sinus pressure and sore throat. Negative for congestion, ear pain and trouble swallowing.    Eyes: Negative for visual disturbance.   Respiratory: Negative for cough, chest tightness, shortness of breath and wheezing.    Cardiovascular: Negative for chest pain, palpitations and leg swelling.   Gastrointestinal: Negative for abdominal pain, constipation, diarrhea, nausea and vomiting.   Genitourinary: Negative for dysuria, frequency and urgency.   Musculoskeletal: Positive for myalgias. Negative for gait problem and neck pain.   Skin: Negative for rash.   Neurological: Negative for dizziness, syncope, weakness and light-headedness.   Psychiatric/Behavioral: Negative for dysphoric mood. The patient is not nervous/anxious.         Objective   Vital Signs:   /98    Pulse 73   Temp 97.9 °F (36.6 °C)   Resp 16   Ht 167 cm (65.75\")   Wt 69.4 kg (153 lb)   SpO2 98%   BMI 24.88 kg/m²      BMI is within normal parameters. No other follow-up for BMI required.        Physical Exam  Vitals and nursing note reviewed.   Constitutional:       General: He is not in acute distress.     Appearance: Normal appearance. He is well-developed. He is not toxic-appearing or diaphoretic.   HENT:      Head: Normocephalic and atraumatic. Hair is normal.      Right Ear: External ear normal. No drainage, swelling or tenderness. Tympanic membrane is not erythematous or retracted.      Left Ear: External ear normal. No drainage, swelling or tenderness. Tympanic membrane is not erythematous or retracted.      Nose: No mucosal edema, congestion or rhinorrhea.      Right Sinus: Frontal sinus tenderness present. No maxillary sinus tenderness.      Left Sinus: Frontal sinus tenderness present. No maxillary sinus tenderness.      Mouth/Throat:      Mouth: Mucous membranes are moist. No oral lesions.      Pharynx: Uvula midline. Posterior oropharyngeal erythema present. No pharyngeal swelling, oropharyngeal exudate or uvula swelling.      Tonsils: No tonsillar exudate or tonsillar abscesses.   Eyes:      General: No scleral icterus.        Right eye: No discharge.         Left eye: No discharge.      Conjunctiva/sclera: Conjunctivae normal.      Pupils: Pupils are equal, round, and reactive to light.   Cardiovascular:      Rate and Rhythm: Normal rate and regular rhythm.      Heart sounds: Normal heart sounds. No murmur heard.    No gallop.   Pulmonary:      Effort: No respiratory distress.      Breath sounds: Normal breath sounds. No stridor or decreased air movement. No decreased breath sounds, wheezing, rhonchi or rales.   Chest:      Chest wall: No tenderness.   Abdominal:      Palpations: Abdomen is soft.      Tenderness: There is no abdominal tenderness.   Musculoskeletal:      Cervical back: Normal  range of motion and neck supple.   Lymphadenopathy:      Cervical: No cervical adenopathy.   Skin:     General: Skin is warm and dry.      Findings: No rash.   Neurological:      Mental Status: He is alert and oriented to person, place, and time.      Motor: No abnormal muscle tone.   Psychiatric:         Behavior: Behavior normal.         Thought Content: Thought content normal.         Judgment: Judgment normal.          The following data was reviewed by: VINH Escobar on 01/05/2023:  Office Visit with Andie Mejia APRN (12/29/2022)  POCT SARS-CoV-2 Antigen YENNI + Flu (01/05/2023 16:18)               Assessment and Plan      Diagnoses and all orders for this visit:    1. Acute frontal sinusitis, recurrence not specified (Primary)  -     cefdinir (OMNICEF) 300 MG capsule; Take 1 capsule by mouth 2 (Two) Times a Day for 10 days.  Dispense: 20 capsule; Refill: 0    2. Nonintractable headache, unspecified chronicity pattern, unspecified headache type  -     POCT SARS-CoV-2 Antigen YENNI + Flu    3. Sore throat  -     POCT SARS-CoV-2 Antigen YENNI + Flu    4. Body aches  -     POCT SARS-CoV-2 Antigen YENNI + Flu    5. Other fatigue  -     POCT SARS-CoV-2 Antigen YENNI + Flu            Follow Up     Return if symptoms worsen or fail to improve.    Patient was given instructions and counseling regarding his condition or for health maintenance advice. Please see specific information pulled into the AVS if appropriate.     -Take medication as prescribed.  Start a daily probiotic.  -Covid and Influenza testing were both negative today.  -Monitor for fever and take Tylenol as needed.  Drink plenty of fluids and get plenty of rest.  -Gargle with warm salt water 3-4 times per day.  -Use cool-mist humidifier as needed.  -Seek immediate medical attention for fever unrelieved by Tylenol, chest pain, shortness of breath, decreased oxygen saturations, sharp pain with breathing, or any other worsening signs or symptoms.  -Return  to the office is symptoms worsen or do not improve.

## 2023-10-17 ENCOUNTER — TELEPHONE (OUTPATIENT)
Dept: FAMILY MEDICINE CLINIC | Facility: CLINIC | Age: 43
End: 2023-10-17
Payer: COMMERCIAL

## 2023-10-17 NOTE — TELEPHONE ENCOUNTER
[facility] staff attempted to follow warm transfer process and was unsuccessful     Caller: Dylan Blank    Relationship to patient: Self    Best call back number: [phone]    Patient is needing: PATIENT CALLED WITH SAME DAY SYMPTOMS OF SORE THROAT, HEADACHE AND TIGHT EAR PAIN, NEEDING TO SCHEDULE AND APPOINTMENT, NO AVAILABILITY FOR TODAY. PLEASE REACH OUT AND ADVISE.

## 2023-10-18 ENCOUNTER — OFFICE VISIT (OUTPATIENT)
Dept: FAMILY MEDICINE CLINIC | Facility: CLINIC | Age: 43
End: 2023-10-18
Payer: COMMERCIAL

## 2023-10-18 VITALS
RESPIRATION RATE: 16 BRPM | DIASTOLIC BLOOD PRESSURE: 80 MMHG | SYSTOLIC BLOOD PRESSURE: 142 MMHG | BODY MASS INDEX: 25.43 KG/M2 | OXYGEN SATURATION: 98 % | TEMPERATURE: 98.9 F | HEIGHT: 66 IN | HEART RATE: 75 BPM | WEIGHT: 158.2 LBS

## 2023-10-18 DIAGNOSIS — H65.191 OTHER ACUTE NONSUPPURATIVE OTITIS MEDIA OF RIGHT EAR, RECURRENCE NOT SPECIFIED: Primary | ICD-10-CM

## 2023-10-18 DIAGNOSIS — J34.89 SINUS PRESSURE: ICD-10-CM

## 2023-10-18 DIAGNOSIS — H61.23 BILATERAL IMPACTED CERUMEN: ICD-10-CM

## 2023-10-18 DIAGNOSIS — J02.9 SORE THROAT: ICD-10-CM

## 2023-10-18 DIAGNOSIS — H92.03 OTALGIA OF BOTH EARS: ICD-10-CM

## 2023-10-18 LAB
EXPIRATION DATE: NORMAL
EXPIRATION DATE: NORMAL
FLUAV AG UPPER RESP QL IA.RAPID: NOT DETECTED
FLUBV AG UPPER RESP QL IA.RAPID: NOT DETECTED
INTERNAL CONTROL: NORMAL
INTERNAL CONTROL: NORMAL
Lab: NORMAL
Lab: NORMAL
S PYO AG THROAT QL: NEGATIVE
SARS-COV-2 AG UPPER RESP QL IA.RAPID: NOT DETECTED

## 2023-10-18 PROCEDURE — 87428 SARSCOV & INF VIR A&B AG IA: CPT

## 2023-10-18 PROCEDURE — 99213 OFFICE O/P EST LOW 20 MIN: CPT

## 2023-10-18 PROCEDURE — 87880 STREP A ASSAY W/OPTIC: CPT

## 2023-10-18 RX ORDER — AMOXICILLIN 500 MG/1
500 CAPSULE ORAL 2 TIMES DAILY
Qty: 20 CAPSULE | Refills: 0 | Status: SHIPPED | OUTPATIENT
Start: 2023-10-18

## 2023-10-18 NOTE — PROGRESS NOTES
Chief Complaint  Follow-up, Headache, Sore Throat, URI, and Earache    Subjective         Headache  Sore Throat   Associated symptoms include congestion, ear pain and headaches. Pertinent negatives include no abdominal pain, coughing, diarrhea, neck pain, shortness of breath, trouble swallowing or vomiting.   URI   Associated symptoms include congestion, ear pain, headaches, sinus pain and a sore throat. Pertinent negatives include no abdominal pain, chest pain, coughing, diarrhea, dysuria, nausea, neck pain, rash, vomiting or wheezing.   Earache   Associated symptoms include headaches and a sore throat. Pertinent negatives include no abdominal pain, coughing, diarrhea, hearing loss, neck pain, rash or vomiting.     Dylan Blank 43 y.o. male presents for evaluation of sinus pressure and congestion, sore throat. Symptoms include ear pressure, congestion, sore throat, post nasal drip, headache, sinus pain, pain while swallowing, and ear pain.  Onset of symptoms was 6 days ago, gradually worsening since that time. Patient denies shortness of breath, wheezing, hemoptysis, pleuritic chest pain, fever, dyspnea on exertion, ear drainage, eye discharge, diarrhea, vomiting, vertigo, sneezing, chills, sweats, epistaxis, high fever.   Evaluation to date: none.  Treatment to date:  Tylenol with some improvement.       Review of Systems   Constitutional:  Negative for appetite change, chills, fatigue and fever.   HENT:  Positive for congestion, ear pain, postnasal drip, sinus pressure and sore throat. Negative for hearing loss, tinnitus and trouble swallowing.    Eyes:  Negative for visual disturbance.   Respiratory:  Negative for cough, chest tightness, shortness of breath and wheezing.    Cardiovascular:  Negative for chest pain, palpitations and leg swelling.   Gastrointestinal:  Negative for abdominal pain, constipation, diarrhea, nausea and vomiting.   Genitourinary:  Negative for dysuria, frequency and urgency.  "  Musculoskeletal:  Negative for gait problem, myalgias and neck pain.   Skin:  Negative for rash.   Neurological:  Negative for dizziness, syncope, weakness and light-headedness.   Psychiatric/Behavioral:  Negative for dysphoric mood. The patient is not nervous/anxious.         Objective   Vital Signs:   /80 (BP Location: Left arm, Patient Position: Sitting, Cuff Size: Adult)   Pulse 75   Temp 98.9 °F (37.2 °C) (Oral)   Resp 16   Ht 167 cm (65.75\")   Wt 71.8 kg (158 lb 3.2 oz)   SpO2 98%   BMI 25.73 kg/m²      BMI is >= 25 and <30. (Overweight) The following options were offered after discussion;: exercise counseling/recommendations and nutrition counseling/recommendations        Physical Exam  Vitals and nursing note reviewed.   Constitutional:       General: He is not in acute distress.     Appearance: He is well-developed. He is not toxic-appearing or diaphoretic.   HENT:      Head: Normocephalic and atraumatic. Hair is normal.      Right Ear: External ear normal. Decreased hearing noted. No drainage, swelling or tenderness. There is impacted cerumen. Tympanic membrane is erythematous. Tympanic membrane is not injected, retracted or bulging.      Left Ear: External ear normal. No decreased hearing noted. No drainage, swelling or tenderness. There is impacted cerumen (mild). Tympanic membrane is not injected, erythematous, retracted or bulging.      Ears:      Comments: Cerumen removal provided improvement today.  TM was able to be viewed following cerumen removal.     Nose: No mucosal edema, congestion or rhinorrhea.      Right Sinus: Frontal sinus tenderness present. No maxillary sinus tenderness.      Left Sinus: Frontal sinus tenderness present. No maxillary sinus tenderness.      Mouth/Throat:      Mouth: Mucous membranes are moist. No oral lesions.      Pharynx: Uvula midline. Posterior oropharyngeal erythema present. No pharyngeal swelling, oropharyngeal exudate or uvula swelling.      " Tonsils: Tonsillar exudate present. No tonsillar abscesses.   Eyes:      General: No scleral icterus.        Right eye: No discharge.         Left eye: No discharge.      Conjunctiva/sclera: Conjunctivae normal.      Pupils: Pupils are equal, round, and reactive to light.   Cardiovascular:      Rate and Rhythm: Normal rate and regular rhythm.      Heart sounds: Normal heart sounds. No murmur heard.     No gallop.   Pulmonary:      Effort: No respiratory distress.      Breath sounds: Normal breath sounds. No stridor or decreased air movement. No decreased breath sounds, wheezing, rhonchi or rales.   Chest:      Chest wall: No tenderness.   Abdominal:      Palpations: Abdomen is soft.      Tenderness: There is no abdominal tenderness.   Musculoskeletal:      Cervical back: Normal range of motion and neck supple.   Lymphadenopathy:      Cervical: No cervical adenopathy.   Skin:     General: Skin is warm and dry.      Findings: No rash.   Neurological:      Mental Status: He is alert and oriented to person, place, and time.      Motor: No abnormal muscle tone.   Psychiatric:         Behavior: Behavior normal.         Thought Content: Thought content normal.         Judgment: Judgment normal.          The following data was reviewed by: VINH Escboar on 10/18/2023:  POCT SARS-CoV-2 Antigen YENNI + Flu (10/18/2023 15:05)  POCT rapid strep A (10/18/2023 15:04)             Assessment and Plan      Diagnoses and all orders for this visit:    1. Other acute nonsuppurative otitis media of right ear, recurrence not specified (Primary)  -     amoxicillin (AMOXIL) 500 MG capsule; Take 1 capsule by mouth 2 (Two) Times a Day.  Dispense: 20 capsule; Refill: 0    2. Sore throat  -     POCT rapid strep A  -     amoxicillin (AMOXIL) 500 MG capsule; Take 1 capsule by mouth 2 (Two) Times a Day.  Dispense: 20 capsule; Refill: 0    3. Otalgia of both ears  -     Ear Cerumen Removal  -     amoxicillin (AMOXIL) 500 MG capsule; Take 1  capsule by mouth 2 (Two) Times a Day.  Dispense: 20 capsule; Refill: 0    4. Bilateral impacted cerumen  -     Ear Cerumen Removal    5. Sinus pressure  -     POCT SARS-CoV-2 Antigen YENNI + Flu  -     amoxicillin (AMOXIL) 500 MG capsule; Take 1 capsule by mouth 2 (Two) Times a Day.  Dispense: 20 capsule; Refill: 0    Other orders  -     Cancel: Ear Cerumen Removal            Follow Up     Return if symptoms worsen or fail to improve.    Patient was given instructions and counseling regarding his condition or for health maintenance advice. Please see specific information pulled into the AVS if appropriate.     -Take medication as prescribed.  Gargle with warm salt water 3-4 times daily.  -Covid, strep, and Influenza testing are all negative today.  -Monitor for fever and take Tylenol as needed.  Drink plenty of fluids and get plenty of rest.  -Use cool-mist humidifier as needed.  -Seek immediate medical attention for fever unrelieved by Tylenol, chest pain, shortness of breath, decreased oxygen saturations, sharp pain with breathing, or any other worsening signs or symptoms.  -Return to the office is symptoms worsen or do not improve.

## 2024-05-15 ENCOUNTER — TELEPHONE (OUTPATIENT)
Dept: FAMILY MEDICINE CLINIC | Facility: CLINIC | Age: 44
End: 2024-05-15
Payer: COMMERCIAL

## 2024-05-15 NOTE — TELEPHONE ENCOUNTER
Patient called with symptoms of bilateral ear pain, symptoms x 3 days.   Hasn't tried any thing  OTC, but he wants to be seen today. Advised to go to  since he wants to be seen today. I gave the  information to the patient.   He was in agreement.

## 2024-05-30 ENCOUNTER — OFFICE VISIT (OUTPATIENT)
Dept: FAMILY MEDICINE CLINIC | Facility: CLINIC | Age: 44
End: 2024-05-30
Payer: COMMERCIAL

## 2024-05-30 VITALS
BODY MASS INDEX: 25.39 KG/M2 | WEIGHT: 158 LBS | SYSTOLIC BLOOD PRESSURE: 160 MMHG | HEART RATE: 84 BPM | DIASTOLIC BLOOD PRESSURE: 104 MMHG | RESPIRATION RATE: 16 BRPM | OXYGEN SATURATION: 97 % | HEIGHT: 66 IN

## 2024-05-30 DIAGNOSIS — I10 ESSENTIAL HYPERTENSION: Primary | ICD-10-CM

## 2024-05-30 DIAGNOSIS — Z12.5 SCREENING FOR PROSTATE CANCER: ICD-10-CM

## 2024-05-30 DIAGNOSIS — Z12.12 SCREENING FOR COLORECTAL CANCER: ICD-10-CM

## 2024-05-30 DIAGNOSIS — Z12.11 SCREENING FOR COLORECTAL CANCER: ICD-10-CM

## 2024-05-30 PROCEDURE — 99214 OFFICE O/P EST MOD 30 MIN: CPT | Performed by: NURSE PRACTITIONER

## 2024-05-30 RX ORDER — VALSARTAN 80 MG/1
80 TABLET ORAL DAILY
Qty: 30 TABLET | Refills: 0 | Status: SHIPPED | OUTPATIENT
Start: 2024-05-30

## 2024-05-30 NOTE — PROGRESS NOTES
Subjective   Dylan Blank is a 44 y.o. male.     Hypertension  Associated symptoms include headaches. Pertinent negatives include no chest pain, palpitations or shortness of breath.      Hypertension (Has had high BP the last 3 times at pain management and very high at  on 5/15/24).    Patient's wife has been checking his blood pressure manually at home and reports it has been ranging from 160s to 190s over 100s.  Patient has been feeling more irritable and is unsure if this is related to his blood pressure being high or just stress.  He does have a high stress job.  His blood pressure has been steadily increasing over the past year and a half.  Last blood pressure that was lower than 120/80 was in December 2022.  He does have family history of hypertension.  He has never been on blood pressure medication previously.  He is also past due for blood work.  His fasting glucose has been elevated on the last few lab sets but he states he does not believe he fasted for the last lab he had done.  He is also due for colorectal cancer screening and would prefer to do Cologuard.  Denies family history of colon cancer.  The following portions of the patient's history were reviewed and updated as appropriate: allergies, current medications, past family history, past medical history, past social history, past surgical history, and problem list.    Review of Systems   Respiratory:  Negative for cough and shortness of breath.    Cardiovascular:  Negative for chest pain and palpitations.   Skin:  Negative for rash.   Neurological:  Positive for headaches.   Psychiatric/Behavioral:  Positive for agitation. Negative for dysphoric mood.        Objective   Physical Exam  Vitals and nursing note reviewed.   Constitutional:       Appearance: Normal appearance. He is well-developed.   Cardiovascular:      Rate and Rhythm: Normal rate and regular rhythm.   Pulmonary:      Effort: Pulmonary effort is normal.      Breath sounds: Normal  breath sounds.   Neurological:      Mental Status: He is alert and oriented to person, place, and time.   Psychiatric:         Mood and Affect: Mood normal.         Behavior: Behavior normal.         Thought Content: Thought content normal.         Judgment: Judgment normal.         Assessment & Plan   Diagnoses and all orders for this visit:    1. Essential hypertension (Primary)  -     Comprehensive metabolic panel  -     Lipid panel  -     CBC and Differential  -     TSH  -     Hemoglobin A1c  -     PSA Screen  -     valsartan (Diovan) 80 MG tablet; Take 1 tablet by mouth Daily.  Dispense: 30 tablet; Refill: 0    2. Screening for colorectal cancer  -     Cologuard - Stool, Per Rectum; Future    3. Screening for prostate cancer  -     PSA Screen        Started patient on valsartan 80 mg daily.  His wife will continue to check his blood pressure manually at home, preferably twice a day.  He will follow-up in 2 weeks for blood pressure check and bring readings to appointment.  He will set up lab appointment to get done prior to his follow-up.

## 2024-06-14 LAB
ALBUMIN SERPL-MCNC: 4.5 G/DL (ref 3.5–5.2)
ALBUMIN/GLOB SERPL: 1.8 G/DL
ALP SERPL-CCNC: 51 U/L (ref 39–117)
ALT SERPL-CCNC: 31 U/L (ref 1–41)
AST SERPL-CCNC: 12 U/L (ref 1–40)
BASOPHILS # BLD AUTO: 0.05 10*3/MM3 (ref 0–0.2)
BASOPHILS NFR BLD AUTO: 0.8 % (ref 0–1.5)
BILIRUB SERPL-MCNC: 0.3 MG/DL (ref 0–1.2)
BUN SERPL-MCNC: 10 MG/DL (ref 6–20)
BUN/CREAT SERPL: 10.9 (ref 7–25)
CALCIUM SERPL-MCNC: 9.5 MG/DL (ref 8.6–10.5)
CHLORIDE SERPL-SCNC: 102 MMOL/L (ref 98–107)
CHOLEST SERPL-MCNC: 355 MG/DL (ref 0–200)
CO2 SERPL-SCNC: 25.7 MMOL/L (ref 22–29)
CREAT SERPL-MCNC: 0.92 MG/DL (ref 0.76–1.27)
EGFRCR SERPLBLD CKD-EPI 2021: 104.5 ML/MIN/1.73
EOSINOPHIL # BLD AUTO: 0.16 10*3/MM3 (ref 0–0.4)
EOSINOPHIL NFR BLD AUTO: 2.7 % (ref 0.3–6.2)
ERYTHROCYTE [DISTWIDTH] IN BLOOD BY AUTOMATED COUNT: 12.5 % (ref 12.3–15.4)
GLOBULIN SER CALC-MCNC: 2.5 GM/DL
GLUCOSE SERPL-MCNC: 100 MG/DL (ref 65–99)
HBA1C MFR BLD: 5.6 % (ref 4.8–5.6)
HCT VFR BLD AUTO: 44.4 % (ref 37.5–51)
HDLC SERPL-MCNC: 45 MG/DL (ref 40–60)
HGB BLD-MCNC: 15.4 G/DL (ref 13–17.7)
IMM GRANULOCYTES # BLD AUTO: 0.03 10*3/MM3 (ref 0–0.05)
IMM GRANULOCYTES NFR BLD AUTO: 0.5 % (ref 0–0.5)
LDLC SERPL CALC-MCNC: 155 MG/DL (ref 0–100)
LYMPHOCYTES # BLD AUTO: 1.11 10*3/MM3 (ref 0.7–3.1)
LYMPHOCYTES NFR BLD AUTO: 18.4 % (ref 19.6–45.3)
MCH RBC QN AUTO: 32.3 PG (ref 26.6–33)
MCHC RBC AUTO-ENTMCNC: 34.7 G/DL (ref 31.5–35.7)
MCV RBC AUTO: 93.1 FL (ref 79–97)
MONOCYTES # BLD AUTO: 0.51 10*3/MM3 (ref 0.1–0.9)
MONOCYTES NFR BLD AUTO: 8.5 % (ref 5–12)
NEUTROPHILS # BLD AUTO: 4.16 10*3/MM3 (ref 1.7–7)
NEUTROPHILS NFR BLD AUTO: 69.1 % (ref 42.7–76)
NRBC BLD AUTO-RTO: 0 /100 WBC (ref 0–0.2)
PLATELET # BLD AUTO: 259 10*3/MM3 (ref 140–450)
POTASSIUM SERPL-SCNC: 4.5 MMOL/L (ref 3.5–5.2)
PROT SERPL-MCNC: 7 G/DL (ref 6–8.5)
PSA SERPL-MCNC: 1.09 NG/ML (ref 0–4)
RBC # BLD AUTO: 4.77 10*6/MM3 (ref 4.14–5.8)
SODIUM SERPL-SCNC: 136 MMOL/L (ref 136–145)
TRIGL SERPL-MCNC: 766 MG/DL (ref 0–150)
TSH SERPL DL<=0.005 MIU/L-ACNC: 2.1 UIU/ML (ref 0.27–4.2)
VLDLC SERPL CALC-MCNC: 155 MG/DL (ref 5–40)
WBC # BLD AUTO: 6.02 10*3/MM3 (ref 3.4–10.8)

## 2024-06-18 DIAGNOSIS — E78.1 HYPERTRIGLYCERIDEMIA: Primary | ICD-10-CM

## 2024-06-18 RX ORDER — ICOSAPENT ETHYL 1 G/1
2 CAPSULE ORAL 2 TIMES DAILY WITH MEALS
Qty: 120 CAPSULE | Refills: 5 | Status: SHIPPED | OUTPATIENT
Start: 2024-06-18

## 2024-06-20 ENCOUNTER — OFFICE VISIT (OUTPATIENT)
Dept: FAMILY MEDICINE CLINIC | Facility: CLINIC | Age: 44
End: 2024-06-20
Payer: COMMERCIAL

## 2024-06-20 VITALS
RESPIRATION RATE: 16 BRPM | SYSTOLIC BLOOD PRESSURE: 152 MMHG | HEART RATE: 69 BPM | DIASTOLIC BLOOD PRESSURE: 90 MMHG | WEIGHT: 158 LBS | OXYGEN SATURATION: 96 % | BODY MASS INDEX: 25.39 KG/M2 | HEIGHT: 66 IN

## 2024-06-20 DIAGNOSIS — E78.1 HYPERTRIGLYCERIDEMIA: Primary | ICD-10-CM

## 2024-06-20 DIAGNOSIS — I10 ESSENTIAL HYPERTENSION: ICD-10-CM

## 2024-06-20 PROCEDURE — 99213 OFFICE O/P EST LOW 20 MIN: CPT | Performed by: NURSE PRACTITIONER

## 2024-06-20 RX ORDER — CELECOXIB 200 MG/1
1 CAPSULE ORAL EVERY 12 HOURS SCHEDULED
COMMUNITY
Start: 2024-04-27

## 2024-06-20 RX ORDER — VALSARTAN 160 MG/1
160 TABLET ORAL DAILY
Qty: 30 TABLET | Refills: 0 | Status: SHIPPED | OUTPATIENT
Start: 2024-06-20

## 2024-06-20 NOTE — PROGRESS NOTES
Subjective   Dylan Blank is a 44 y.o. male.     History of Present Illness   Patient presents to office to follow-up on hypertension.  At last visit he was started on valsartan 80 mg daily.  He has been taking as prescribed.  He did fill some fatigue initially on medication but that has improved some.  He he and wife have also made lifestyle changes and have started making healthier choices and preparing meals at home.  He is also significantly increased water intake.  She has been checking his blood pressure manually at home and readings have bounced around from his low is 110 systolic to 190 but majority of readings seem to be 120s to 150s systolic which is still fairly broad range.    Labs reviewed with pt today during visit. All questions answered.    He has not been able to get Vascepa yet as it requires PA by his insurance.  His triglycerides have been elevated in the past but not nearly as high as his most recent lab set.  Discussed this with him including likelihood of familial hypertriglyceridemia as well as some lifestyle driven.        The following portions of the patient's history were reviewed and updated as appropriate: allergies, current medications, past family history, past medical history, past social history, past surgical history, and problem list.    Review of Systems   Constitutional:  Positive for fatigue.   Respiratory:  Negative for shortness of breath.    Cardiovascular:  Negative for chest pain and palpitations.   Skin:  Negative for rash.   Psychiatric/Behavioral:  Negative for dysphoric mood.        Objective   Physical Exam  Vitals and nursing note reviewed.   Constitutional:       Appearance: Normal appearance. He is well-developed.   Cardiovascular:      Rate and Rhythm: Normal rate and regular rhythm.   Pulmonary:      Effort: Pulmonary effort is normal.      Breath sounds: Normal breath sounds.   Neurological:      Mental Status: He is alert and oriented to person, place, and  time.   Psychiatric:         Mood and Affect: Mood normal.         Behavior: Behavior normal.         Thought Content: Thought content normal.         Judgment: Judgment normal.         Assessment & Plan   Diagnoses and all orders for this visit:    1. Hypertriglyceridemia (Primary)    2. Essential hypertension  -     valsartan (Diovan) 160 MG tablet; Take 1 tablet by mouth Daily.  Dispense: 30 tablet; Refill: 0               Gave him samples of Vascepa to take until PA approved.    Increase valsartan dose to 160 mg daily.  Wife will continue checking blood pressure at home and keeping a log of this.  They will follow-up again in 2 weeks for recheck.    Will recheck lipid panel 4 weeks after he has been on Vascepa to see how much medication and lifestyle changes have improved his triglycerides and LDL cholesterol.

## 2024-07-08 ENCOUNTER — OFFICE VISIT (OUTPATIENT)
Dept: FAMILY MEDICINE CLINIC | Facility: CLINIC | Age: 44
End: 2024-07-08
Payer: COMMERCIAL

## 2024-07-08 VITALS
SYSTOLIC BLOOD PRESSURE: 120 MMHG | DIASTOLIC BLOOD PRESSURE: 64 MMHG | BODY MASS INDEX: 25.39 KG/M2 | OXYGEN SATURATION: 98 % | RESPIRATION RATE: 16 BRPM | WEIGHT: 158 LBS | HEIGHT: 66 IN | HEART RATE: 70 BPM

## 2024-07-08 DIAGNOSIS — E78.1 HYPERTRIGLYCERIDEMIA: Primary | ICD-10-CM

## 2024-07-08 DIAGNOSIS — I10 ESSENTIAL HYPERTENSION: ICD-10-CM

## 2024-07-08 PROCEDURE — 99214 OFFICE O/P EST MOD 30 MIN: CPT | Performed by: NURSE PRACTITIONER

## 2024-07-08 RX ORDER — VALSARTAN 160 MG/1
TABLET ORAL
Qty: 45 TABLET | Refills: 0 | Status: SHIPPED | OUTPATIENT
Start: 2024-07-08

## 2024-07-08 NOTE — PROGRESS NOTES
Subjective   Dylan Blank is a 44 y.o. male.     Hypertension  Pertinent negatives include no chest pain, palpitations or shortness of breath.      Patient presents to office accompanied by his wife for follow-up on hypertension.  He has been taking valsartan 160 mg daily as prescribed.  She has been checking patient's blood pressure manually 2-3 times a day.  Majority of morning readings are 120s over 70s to 80s but readings show a tendency to increase in the afternoon and evening.  Patient has more readings with the diastolic being in the 90s to 100 range in the evening.  Patient stopped taking Vascepa due to side effects.  Has made significant changes in his diet and would like to recheck triglyceride level before starting fenofibrate.  The following portions of the patient's history were reviewed and updated as appropriate: allergies, current medications, past family history, past medical history, past social history, past surgical history, and problem list.    Review of Systems   Constitutional:  Positive for fatigue.   Respiratory:  Negative for cough and shortness of breath.    Cardiovascular:  Negative for chest pain and palpitations.   Skin:  Negative for rash.   Psychiatric/Behavioral:  Negative for dysphoric mood and sleep disturbance. The patient is not nervous/anxious.        Objective   Physical Exam  Vitals and nursing note reviewed.   Constitutional:       Appearance: Normal appearance. He is well-developed.   Cardiovascular:      Rate and Rhythm: Normal rate.   Pulmonary:      Effort: Pulmonary effort is normal.   Neurological:      Mental Status: He is alert and oriented to person, place, and time.   Psychiatric:         Mood and Affect: Mood normal.         Behavior: Behavior normal.         Thought Content: Thought content normal.         Judgment: Judgment normal.         Assessment & Plan   Diagnoses and all orders for this visit:    1. Hypertriglyceridemia (Primary)  -     Lipid panel    2.  Essential hypertension  -     valsartan (Diovan) 160 MG tablet; Take 1 tablet by mouth every morning and 0.5 tablet every afternoon  Dispense: 45 tablet; Refill: 0        Will recheck lipid panel 1 day when he is fasting to see what his triglyceride level is doing with diet changes.  He understands he may still need medication to lower triglyceride level and is willing to take fenofibrate if necessary.  Will add an extra half tablet of valsartan in the evening so he will take 1 full tablet in the morning and just a half tab in the in the afternoon or evening.  His wife will continue to monitor his blood pressure and record this and send readings within the next 2 weeks.

## 2024-07-12 DIAGNOSIS — E78.1 HYPERTRIGLYCERIDEMIA: Primary | ICD-10-CM

## 2024-07-12 LAB
CHOLEST SERPL-MCNC: 324 MG/DL (ref 100–199)
HDLC SERPL-MCNC: 40 MG/DL
LDL CALC COMMENT:: ABNORMAL
LDLC SERPL CALC-MCNC: 147 MG/DL (ref 0–99)
TRIGL SERPL-MCNC: 699 MG/DL (ref 0–149)
VLDLC SERPL CALC-MCNC: 137 MG/DL (ref 5–40)

## 2024-07-12 RX ORDER — FENOFIBRATE 160 MG/1
160 TABLET ORAL DAILY
Qty: 90 TABLET | Refills: 1 | Status: SHIPPED | OUTPATIENT
Start: 2024-07-12

## 2024-07-22 DIAGNOSIS — R07.9 INTERMITTENT CHEST PAIN: Primary | ICD-10-CM

## 2024-07-22 DIAGNOSIS — R00.2 PALPITATIONS: ICD-10-CM

## 2024-07-22 RX ORDER — AMLODIPINE BESYLATE 5 MG/1
5 TABLET ORAL DAILY
Qty: 30 TABLET | Refills: 0 | Status: SHIPPED | OUTPATIENT
Start: 2024-07-22

## 2024-08-01 ENCOUNTER — OFFICE VISIT (OUTPATIENT)
Dept: CARDIOLOGY | Facility: CLINIC | Age: 44
End: 2024-08-01
Payer: COMMERCIAL

## 2024-08-01 VITALS
HEIGHT: 65 IN | DIASTOLIC BLOOD PRESSURE: 86 MMHG | SYSTOLIC BLOOD PRESSURE: 142 MMHG | HEART RATE: 60 BPM | BODY MASS INDEX: 26.39 KG/M2 | WEIGHT: 158.4 LBS

## 2024-08-01 DIAGNOSIS — E78.2 MIXED HYPERLIPIDEMIA: ICD-10-CM

## 2024-08-01 DIAGNOSIS — I10 ESSENTIAL HYPERTENSION: Primary | ICD-10-CM

## 2024-08-01 DIAGNOSIS — R07.2 PRECORDIAL PAIN: ICD-10-CM

## 2024-08-01 DIAGNOSIS — R94.31 ABNORMAL EKG: ICD-10-CM

## 2024-08-01 PROCEDURE — 93000 ELECTROCARDIOGRAM COMPLETE: CPT | Performed by: INTERNAL MEDICINE

## 2024-08-01 PROCEDURE — 99204 OFFICE O/P NEW MOD 45 MIN: CPT | Performed by: INTERNAL MEDICINE

## 2024-08-01 NOTE — PROGRESS NOTES
[facility]      Patient Name: Dylan Blank  :1980  Age: 44 y.o.  Encounter Provider:  Filipe Quezada Jr, MD      Chief Complaint:   Chief Complaint   Patient presents with    Chest Pain         Chest Pain   Pertinent negatives include no abdominal pain, cough, dizziness, fever, near-syncope, orthopnea, palpitations, PND or syncope.   Dylan Blank is a 44 y.o. male past medical history of hypertension presents for evaluation of uncontrolled blood pressure.  Patient states that he is only recently been started on blood intermittent medication and was uptitrated from valsartan of 160 mg to 240 mg and amlodipine was added.  He is not taking the valsartan as instructed and is only taking 160 mg once daily.  He works a very strenuous job but is not as physically active as he has been previously.  When he was on the job a few weeks ago it was very hot out and he was performing strenuous activity at which time he had some chest discomfort which resolved after he rested for a while.  Blood pressure is borderline elevated today 142/86 mmHg.  No family history of coronary artery disease.  He smokes a pack of cigarettes daily and drinks 4-6 beers daily.  His cholesterol is extremely elevated with a total cholesterol greater than 300 with LDL of 147 and triglycerides in the range of 5-700.      The following portions of the patient's history were reviewed and updated as appropriate: allergies, current medications, past family history, past medical history, past social history, past surgical history and problem list.      Review of Systems   Constitutional: Negative for chills and fever.   HENT:  Negative for hoarse voice and sore throat.    Eyes:  Negative for double vision and photophobia.   Cardiovascular:  Positive for chest pain. Negative for leg swelling, near-syncope, orthopnea, palpitations, paroxysmal nocturnal dyspnea and syncope.   Respiratory:  Negative for cough and wheezing.   "  Skin:  Negative for poor wound healing and rash.   Musculoskeletal:  Negative for arthritis and joint swelling.   Gastrointestinal:  Negative for bloating, abdominal pain, hematemesis and hematochezia.   Neurological:  Negative for dizziness and focal weakness.   Psychiatric/Behavioral:  Negative for depression and suicidal ideas.      OBJECTIVE:   Vital Signs  Vitals:    08/01/24 1133   BP: 142/86   Pulse: 60     Estimated body mass index is 26.36 kg/m² as calculated from the following:    Height as of this encounter: 165.1 cm (65\").    Weight as of this encounter: 71.8 kg (158 lb 6.4 oz).    Vitals reviewed.   Constitutional:       Appearance: Healthy appearance. Not in distress.   Neck:      Vascular: No JVR. JVD normal.   Pulmonary:      Effort: Pulmonary effort is normal.      Breath sounds: Normal breath sounds. No wheezing. No rhonchi. No rales.   Chest:      Chest wall: Not tender to palpatation.   Cardiovascular:      PMI at left midclavicular line. Normal rate. Regular rhythm. Normal S1. Normal S2.       Murmurs: There is no murmur.      No gallop.  No click. No rub.   Pulses:     Intact distal pulses.   Edema:     Peripheral edema absent.   Abdominal:      General: Bowel sounds are normal.      Palpations: Abdomen is soft.      Tenderness: There is no abdominal tenderness.   Musculoskeletal: Normal range of motion.         General: No tenderness. Skin:     General: Skin is warm and dry.   Neurological:      General: No focal deficit present.      Mental Status: Alert and oriented to person, place and time.       ECG 12 Lead    Date/Time: 8/1/2024 11:40 AM  Performed by: Filipe Quezada Jr., MD    Authorized by: Filipe Quezada Jr., MD  Comparison: not compared with previous ECG   Previous ECG: no previous ECG available  Rhythm: sinus rhythm  Other findings: left ventricular hypertrophy    Clinical impression: abnormal EKG      Lipid Panel          6/13/2024    11:01 7/11/2024    12:05   Lipid Panel "   Total Cholesterol 355  324    Triglycerides 766  699    HDL Cholesterol 45  40    VLDL Cholesterol 155  137    LDL Cholesterol  155  147         BUN   Date Value Ref Range Status   06/13/2024 10 6 - 20 mg/dL Final   02/02/2018 11 6 - 20 mg/dL Final     Creatinine   Date Value Ref Range Status   06/13/2024 0.92 0.76 - 1.27 mg/dL Final   02/02/2018 0.78 0.76 - 1.27 mg/dL Final     Potassium   Date Value Ref Range Status   06/13/2024 4.5 3.5 - 5.2 mmol/L Final   02/02/2018 3.6 3.5 - 5.2 mmol/L Final     ALT (SGPT)   Date Value Ref Range Status   06/13/2024 31 1 - 41 U/L Final   01/30/2018 64 (H) 1 - 41 U/L Final     AST (SGOT)   Date Value Ref Range Status   06/13/2024 12 1 - 40 U/L Final   01/30/2018 27 1 - 40 U/L Final           ASSESSMENT:     44-year-old male presents for evaluation of uncontrolled hypertension      PLAN OF CARE:     Hypertension -blood pressure is borderline elevated today in clinic.  He is not taking medications as instructed but is worried about his blood pressure getting too low.  He will  keep a twice daily blood pressure log and mind dietary sodium intake.  I have counseled him on decreasing alcohol intake especially given the high sodium content of beer.  Normal EKG with LVH noted.  Check echocardiogram.  Precordial pain -low risk characteristics but I would like the patient to perform a treadmill stress study and watch his blood pressure response to exercise.  Mixed hyperlipidemia -quite elevated in the range of familial hypercholesterolemia patient's.  I do not think Ragona get away with just a fibrate.  I will watch this for the next 6 months and have asked him to decrease red meat intake and be very mindful of dietary cholesterol.  It is a high likelihood he will need a statin in the near future    Return to clinic 6 months             Discharge Medications            Accurate as of August 1, 2024 11:40 AM. If you have any questions, ask your nurse or doctor.                Changes to  Medications        Instructions Start Date   valsartan 160 MG tablet  Commonly known as: Diovan  What changed: additional instructions   Take 1 tablet by mouth every morning and 0.5 tablet every afternoon             Continue These Medications        Instructions Start Date   ALPRAZolam 0.25 MG tablet  Commonly known as: XANAX   0.25 mg, Oral, Every 6 Hours PRN, for anxiety      amLODIPine 5 MG tablet  Commonly known as: NORVASC   5 mg, Oral, Daily      celecoxib 200 MG capsule  Commonly known as: CeleBREX   1 capsule, Every 12 Hours Scheduled      fenofibrate 160 MG tablet   160 mg, Oral, Daily      HYDROcodone-acetaminophen 5-325 MG per tablet  Commonly known as: NORCO   1 tablet, Oral, 2 Times Daily               Thank you for allowing me to participate in the care of your patient,      Sincerely,   Filipe Quezada MD  [facility]  08/01/24  11:40 EDT

## 2024-08-20 DIAGNOSIS — I10 ESSENTIAL HYPERTENSION: ICD-10-CM

## 2024-08-20 RX ORDER — AMLODIPINE BESYLATE 5 MG/1
5 TABLET ORAL DAILY
Qty: 90 TABLET | Refills: 1 | Status: SHIPPED | OUTPATIENT
Start: 2024-08-20

## 2024-08-20 RX ORDER — VALSARTAN 160 MG/1
TABLET ORAL
Qty: 135 TABLET | Refills: 1 | Status: SHIPPED | OUTPATIENT
Start: 2024-08-20

## 2024-08-20 NOTE — TELEPHONE ENCOUNTER
Last visit: 7-8-24  Next visit: Return in 3 months (on 10/8/2024), or if symptoms worsen or fail to improve, for Next scheduled follow up.

## 2024-08-20 NOTE — TELEPHONE ENCOUNTER
Last visit: 7-8-24  Next visit:return in 3 months (on 10/8/2024), or if symptoms worsen or fail to improve, for Next scheduled follow up.

## 2024-08-26 ENCOUNTER — TELEPHONE (OUTPATIENT)
Dept: CARDIOLOGY | Facility: CLINIC | Age: 44
End: 2024-08-26
Payer: COMMERCIAL

## 2024-08-27 ENCOUNTER — HOSPITAL ENCOUNTER (OUTPATIENT)
Dept: CARDIOLOGY | Facility: HOSPITAL | Age: 44
Discharge: HOME | End: 2024-08-27
Admitting: INTERNAL MEDICINE
Payer: COMMERCIAL

## 2024-08-27 ENCOUNTER — HOSPITAL ENCOUNTER (OUTPATIENT)
Dept: CARDIOLOGY | Facility: HOSPITAL | Age: 44
Discharge: HOME | End: 2024-08-27
Payer: COMMERCIAL

## 2024-08-27 VITALS
BODY MASS INDEX: 26.33 KG/M2 | SYSTOLIC BLOOD PRESSURE: 120 MMHG | DIASTOLIC BLOOD PRESSURE: 72 MMHG | HEIGHT: 65 IN | WEIGHT: 158 LBS

## 2024-08-27 DIAGNOSIS — R94.31 ABNORMAL EKG: ICD-10-CM

## 2024-08-27 DIAGNOSIS — R07.2 PRECORDIAL PAIN: ICD-10-CM

## 2024-08-27 LAB
AORTIC ARCH: 2.8 CM
AORTIC DIMENSIONLESS INDEX: 0.7 (DI)
BH CV ECHO MEAS - ACS: 1.94 CM
BH CV ECHO MEAS - AO MAX PG: 7.5 MMHG
BH CV ECHO MEAS - AO MEAN PG: 3.7 MMHG
BH CV ECHO MEAS - AO ROOT DIAM: 2.6 CM
BH CV ECHO MEAS - AO V2 MAX: 136.5 CM/SEC
BH CV ECHO MEAS - AO V2 VTI: 28.6 CM
BH CV ECHO MEAS - AVA(I,D): 2.9 CM2
BH CV ECHO MEAS - EDV(CUBED): 73.3 ML
BH CV ECHO MEAS - EDV(MOD-SP2): 85 ML
BH CV ECHO MEAS - EDV(MOD-SP4): 95 ML
BH CV ECHO MEAS - EF(MOD-BP): 66.3 %
BH CV ECHO MEAS - EF(MOD-SP2): 61.2 %
BH CV ECHO MEAS - EF(MOD-SP4): 67.4 %
BH CV ECHO MEAS - ESV(CUBED): 21.5 ML
BH CV ECHO MEAS - ESV(MOD-SP2): 33 ML
BH CV ECHO MEAS - ESV(MOD-SP4): 31 ML
BH CV ECHO MEAS - FS: 33.6 %
BH CV ECHO MEAS - IVS/LVPW: 1.04 CM
BH CV ECHO MEAS - IVSD: 1.02 CM
BH CV ECHO MEAS - LAT PEAK E' VEL: 11.6 CM/SEC
BH CV ECHO MEAS - LV DIASTOLIC VOL/BSA (35-75): 53.1 CM2
BH CV ECHO MEAS - LV MASS(C)D: 136.9 GRAMS
BH CV ECHO MEAS - LV MAX PG: 4.6 MMHG
BH CV ECHO MEAS - LV MEAN PG: 1.99 MMHG
BH CV ECHO MEAS - LV SYSTOLIC VOL/BSA (12-30): 17.3 CM2
BH CV ECHO MEAS - LV V1 MAX: 107.7 CM/SEC
BH CV ECHO MEAS - LV V1 VTI: 21.4 CM
BH CV ECHO MEAS - LVIDD: 4.2 CM
BH CV ECHO MEAS - LVIDS: 2.8 CM
BH CV ECHO MEAS - LVOT AREA: 3.9 CM2
BH CV ECHO MEAS - LVOT DIAM: 2.22 CM
BH CV ECHO MEAS - LVPWD: 0.98 CM
BH CV ECHO MEAS - MED PEAK E' VEL: 8.1 CM/SEC
BH CV ECHO MEAS - MV A DUR: 0.08 SEC
BH CV ECHO MEAS - MV A MAX VEL: 40.4 CM/SEC
BH CV ECHO MEAS - MV DEC SLOPE: 540.8 CM/SEC2
BH CV ECHO MEAS - MV DEC TIME: 0.24 SEC
BH CV ECHO MEAS - MV E MAX VEL: 76.5 CM/SEC
BH CV ECHO MEAS - MV E/A: 1.89
BH CV ECHO MEAS - MV MAX PG: 3.2 MMHG
BH CV ECHO MEAS - MV MEAN PG: 0.8 MMHG
BH CV ECHO MEAS - MV P1/2T: 50 MSEC
BH CV ECHO MEAS - MV V2 VTI: 32.7 CM
BH CV ECHO MEAS - MVA(P1/2T): 4.4 CM2
BH CV ECHO MEAS - MVA(VTI): 2.5 CM2
BH CV ECHO MEAS - PA ACC TIME: 0.07 SEC
BH CV ECHO MEAS - PA V2 MAX: 100.1 CM/SEC
BH CV ECHO MEAS - PULM A REVS DUR: 0.11 SEC
BH CV ECHO MEAS - PULM A REVS VEL: 38.6 CM/SEC
BH CV ECHO MEAS - PULM DIAS VEL: 53.5 CM/SEC
BH CV ECHO MEAS - PULM S/D: 1.22
BH CV ECHO MEAS - PULM SYS VEL: 65.3 CM/SEC
BH CV ECHO MEAS - RAP SYSTOLE: 3 MMHG
BH CV ECHO MEAS - RV MAX PG: 2.27 MMHG
BH CV ECHO MEAS - RV V1 MAX: 75.3 CM/SEC
BH CV ECHO MEAS - RV V1 VTI: 18.3 CM
BH CV ECHO MEAS - SV(LVOT): 82.9 ML
BH CV ECHO MEAS - SV(MOD-SP2): 52 ML
BH CV ECHO MEAS - SV(MOD-SP4): 64 ML
BH CV ECHO MEAS - SVI(LVOT): 46.3 ML/M2
BH CV ECHO MEAS - SVI(MOD-SP2): 29.1 ML/M2
BH CV ECHO MEAS - SVI(MOD-SP4): 35.8 ML/M2
BH CV ECHO MEAS - TAPSE (>1.6): 1.99 CM
BH CV ECHO MEASUREMENTS AVERAGE E/E' RATIO: 7.77
BH CV STRESS BP STAGE 1: NORMAL
BH CV STRESS BP STAGE 2: NORMAL
BH CV STRESS BP STAGE 3: NORMAL
BH CV STRESS DURATION MIN STAGE 1: 3
BH CV STRESS DURATION MIN STAGE 2: 3
BH CV STRESS DURATION MIN STAGE 3: 3
BH CV STRESS DURATION SEC STAGE 1: 0
BH CV STRESS DURATION SEC STAGE 2: 0
BH CV STRESS DURATION SEC STAGE 3: 0
BH CV STRESS GRADE STAGE 1: 10
BH CV STRESS GRADE STAGE 2: 12
BH CV STRESS GRADE STAGE 3: 14
BH CV STRESS HR STAGE 1: 118
BH CV STRESS HR STAGE 2: 140
BH CV STRESS HR STAGE 3: 158
BH CV STRESS METS STAGE 1: 5
BH CV STRESS METS STAGE 2: 7.5
BH CV STRESS METS STAGE 3: 10
BH CV STRESS PROTOCOL 1: NORMAL
BH CV STRESS RECOVERY BP: NORMAL MMHG
BH CV STRESS RECOVERY HR: 86 BPM
BH CV STRESS SPEED STAGE 1: 1.7
BH CV STRESS SPEED STAGE 2: 2.5
BH CV STRESS SPEED STAGE 3: 3.4
BH CV STRESS STAGE 1: 1
BH CV STRESS STAGE 2: 2
BH CV STRESS STAGE 3: 3
BH CV XLRA - RV BASE: 3.8 CM
BH CV XLRA - RV LENGTH: 6.8 CM
BH CV XLRA - RV MID: 2.43 CM
BH CV XLRA - TDI S': 15.9 CM/SEC
LEFT ATRIUM VOLUME INDEX: 20.3 ML/M2
MAXIMAL PREDICTED HEART RATE: 175 BPM
PERCENT MAX PREDICTED HR: 90.29 %
SINUS: 2.46 CM
STJ: 2.6 CM
STRESS BASELINE BP: NORMAL MMHG
STRESS BASELINE HR: 66 BPM
STRESS PERCENT HR: 106 %
STRESS POST ESTIMATED WORKLOAD: 10 METS
STRESS POST EXERCISE DUR MIN: 9 MIN
STRESS POST EXERCISE DUR SEC: 0 SEC
STRESS POST PEAK BP: NORMAL MMHG
STRESS POST PEAK HR: 158 BPM
STRESS TARGET HR: 149 BPM

## 2024-08-27 PROCEDURE — 93017 CV STRESS TEST TRACING ONLY: CPT

## 2024-08-27 PROCEDURE — 93306 TTE W/DOPPLER COMPLETE: CPT

## 2024-08-28 ENCOUNTER — TELEPHONE (OUTPATIENT)
Dept: CARDIOLOGY | Facility: CLINIC | Age: 44
End: 2024-08-28
Payer: COMMERCIAL

## 2024-08-28 NOTE — TELEPHONE ENCOUNTER
Pt called back. Went over results. He verbalized understanding.    Thank you,    Brittany Silver, RN  [facility] [facility]  08/28/24 09:03 EDT

## 2024-08-28 NOTE — TELEPHONE ENCOUNTER
Called and left VM, will continue to try to reach pt.    HUB- please put patient straight through to triage    Beth Espinoza RN  Triage RN  08/28/24 08:44 EDT

## 2024-08-28 NOTE — TELEPHONE ENCOUNTER
----- Message from Filipe Quezada sent at 8/27/2024  4:14 PM EDT -----  Please let patient know this is a normal stress study    Filipe Quezada Jr., MD  P k [facility] Triage Nurses Pool  Please the patient know this is a normal echocardiogram

## 2024-09-17 RX ORDER — SILDENAFIL 100 MG/1
TABLET, FILM COATED ORAL
Qty: 30 TABLET | Refills: 0 | Status: SHIPPED | OUTPATIENT
Start: 2024-09-17

## 2024-12-27 DIAGNOSIS — I10 ESSENTIAL HYPERTENSION: ICD-10-CM

## 2024-12-31 RX ORDER — VALSARTAN 160 MG/1
TABLET ORAL
Qty: 135 TABLET | Refills: 1 | Status: SHIPPED | OUTPATIENT
Start: 2024-12-31

## 2025-01-02 ENCOUNTER — TELEPHONE (OUTPATIENT)
Dept: FAMILY MEDICINE CLINIC | Facility: CLINIC | Age: 45
End: 2025-01-02
Payer: COMMERCIAL

## 2025-01-02 DIAGNOSIS — E78.1 HYPERTRIGLYCERIDEMIA: ICD-10-CM

## 2025-01-02 RX ORDER — FENOFIBRATE 160 MG/1
160 TABLET ORAL DAILY
Qty: 90 TABLET | Refills: 0 | Status: SHIPPED | OUTPATIENT
Start: 2025-01-02

## 2025-01-02 RX ORDER — SILDENAFIL 100 MG/1
TABLET, FILM COATED ORAL
Qty: 30 TABLET | Refills: 2 | Status: SHIPPED | OUTPATIENT
Start: 2025-01-02

## 2025-01-02 NOTE — TELEPHONE ENCOUNTER
[facility] calling to request refills on:  Sidenafil; 100mg 1 tablet prior to intercourse would like 90 days if possible  Fenofibrate 160mg tab 1 tablet daily, would like 90 days

## 2025-01-22 NOTE — TELEPHONE ENCOUNTER
Calcium-Channel Blockers Protocol [id number]/22/2025 03:09 PM   Protocol Details Recent or future visit with authorizing provider        Rx Refill Note  Requested Prescriptions     Pending Prescriptions Disp Refills    amLODIPine (NORVASC) 5 MG tablet [Pharmacy Med Name: amlodipine 5 mg tablet] 90 tablet 1     Sig: TAKE ONE TABLET BY MOUTH DAILY      Last office visit with prescribing clinician: 7/8/2024   Last telemedicine visit with prescribing clinician: Visit date not found   Next office visit with prescribing clinician: Visit date not found                         Would you like a call back once the refill request has been completed: [] Yes [] No    If the office needs to give you a call back, can they leave a voicemail: [] Yes [] No    Jluis Chadn MA  01/22/25, 15:09 EST

## 2025-01-24 RX ORDER — AMLODIPINE BESYLATE 5 MG/1
5 TABLET ORAL DAILY
Qty: 90 TABLET | Refills: 1 | Status: SHIPPED | OUTPATIENT
Start: 2025-01-24

## 2025-04-16 DIAGNOSIS — E78.1 HYPERTRIGLYCERIDEMIA: ICD-10-CM

## 2025-04-16 RX ORDER — FENOFIBRATE 160 MG/1
160 TABLET ORAL DAILY
Qty: 90 TABLET | Refills: 0 | OUTPATIENT
Start: 2025-04-16

## 2025-04-16 NOTE — TELEPHONE ENCOUNTER
..Rx Refill Note  Requested Prescriptions     Refused Prescriptions Disp Refills    fenofibrate 160 MG tablet 90 tablet 0     Sig: Take 1 tablet by mouth Daily.     Refused By: DEVONTE MATHEW     Reason for Refusal: Patient needs an appointment      Last office visit with prescribing clinician: 7/8/2024   Last telemedicine visit with prescribing clinician: Visit date not found   Next office visit with prescribing clinician: Visit date not found                         Would you like a call back once the refill request has been completed: [] Yes [] No    If the office needs to give you a call back, can they leave a voicemail: [] Yes [] No    Devonte Mathew MA  04/16/25, 14:22 EDT

## 2025-05-12 ENCOUNTER — TELEPHONE (OUTPATIENT)
Dept: FAMILY MEDICINE CLINIC | Facility: CLINIC | Age: 45
End: 2025-05-12
Payer: COMMERCIAL

## 2025-05-12 NOTE — TELEPHONE ENCOUNTER
Called Pt to schedule an Medication refill appt. Scheduled him for 5/19. Pt stated he is not out of medication at the moment.

## 2025-05-19 ENCOUNTER — OFFICE VISIT (OUTPATIENT)
Dept: FAMILY MEDICINE CLINIC | Facility: CLINIC | Age: 45
End: 2025-05-19
Payer: COMMERCIAL

## 2025-05-19 VITALS
TEMPERATURE: 98.2 F | DIASTOLIC BLOOD PRESSURE: 70 MMHG | HEIGHT: 65 IN | HEART RATE: 68 BPM | BODY MASS INDEX: 26.36 KG/M2 | SYSTOLIC BLOOD PRESSURE: 132 MMHG | WEIGHT: 158.2 LBS | OXYGEN SATURATION: 97 %

## 2025-05-19 DIAGNOSIS — E78.1 HYPERTRIGLYCERIDEMIA: ICD-10-CM

## 2025-05-19 DIAGNOSIS — I10 ESSENTIAL HYPERTENSION: ICD-10-CM

## 2025-05-19 DIAGNOSIS — Z12.5 SCREENING FOR PROSTATE CANCER: ICD-10-CM

## 2025-05-19 PROCEDURE — 99214 OFFICE O/P EST MOD 30 MIN: CPT | Performed by: NURSE PRACTITIONER

## 2025-05-19 RX ORDER — ALPRAZOLAM 0.5 MG
0.5 TABLET ORAL 3 TIMES DAILY PRN
COMMUNITY
Start: 2025-05-03

## 2025-05-19 RX ORDER — HYDROCODONE BITARTRATE AND ACETAMINOPHEN 7.5; 325 MG/1; MG/1
1 TABLET ORAL EVERY 6 HOURS
COMMUNITY
Start: 2025-04-26

## 2025-05-19 RX ORDER — VALSARTAN 160 MG/1
TABLET ORAL
Qty: 135 TABLET | Refills: 1 | Status: SHIPPED | OUTPATIENT
Start: 2025-05-19

## 2025-05-19 RX ORDER — AMLODIPINE BESYLATE 5 MG/1
5 TABLET ORAL DAILY
Qty: 90 TABLET | Refills: 1 | Status: SHIPPED | OUTPATIENT
Start: 2025-05-19

## 2025-05-19 RX ORDER — FENOFIBRATE 160 MG/1
160 TABLET ORAL DAILY
Qty: 90 TABLET | Refills: 1 | Status: SHIPPED | OUTPATIENT
Start: 2025-05-19

## 2025-05-19 NOTE — PROGRESS NOTES
"Subjective   Dylan Blank is a 45 y.o. male.     History of Present Illness    Since the last visit, he has overall felt well.  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  He has Primary Hypertension and well controlled on current medication.  He is still taking fenofibrate daily for elevated triglycerides.  He is due for labs and will set these up for another day when fasting.  he has been compliant with current medications have reviewed them.  The patient denies medication side effects.  Will refill medications. /70   Pulse 68   Temp 98.2 °F (36.8 °C) (Temporal)   Ht 165.1 cm (65\")   Wt 71.8 kg (158 lb 3.2 oz)   SpO2 97%   BMI 26.33 kg/m² .        Results for orders placed or performed during the hospital encounter of 08/27/24   Treadmill Stress Test    Collection Time: 08/27/24  1:25 PM   Result Value Ref Range    BH CV STRESS PROTOCOL 1 Ben     Stage 1 1.0     HR Stage 1 118     BP Stage 1 146/80     Duration Min Stage 1 3     Duration Sec Stage 1 0     Grade Stage 1 10     Speed Stage 1 1.7     BH CV STRESS METS STAGE 1 5.0     Stage 2 2.0     HR Stage 2 140     BP Stage 2 192/88     Duration Min Stage 2 3     Duration Sec Stage 2 0     Grade Stage 2 12     Speed Stage 2 2.5     BH CV STRESS METS STAGE 2 7.5     Stage 3 3.0     HR Stage 3 158     BP Stage 3 198/92     Duration Min Stage 3 3     Duration Sec Stage 3 0     Grade Stage 3 14     Speed Stage 3 3.4     BH CV STRESS METS STAGE 3 10.0     Baseline HR 66 bpm    Baseline /82 mmHg    Peak  bpm    Peak /92 mmHg    Recovery HR 86 bpm    Recovery /76 mmHg    Target HR (85%) 149 bpm    Max. Pred. HR (100%) 175 bpm    Percent Max Pred HR 90.29 %    Exercise duration (min) 9 min    Exercise duration (sec) 0 sec    Estimated workload 10.0 METS    Percent Target  %           The following portions of the patient's history were reviewed and updated " as appropriate: allergies, current medications, past family history, past medical history, past social history, past surgical history, and problem list.    Review of Systems   Respiratory:  Negative for cough and shortness of breath.    Cardiovascular:  Negative for chest pain and palpitations.   Skin:  Negative for rash.   Psychiatric/Behavioral:  Negative for dysphoric mood and sleep disturbance. The patient is not nervous/anxious.        Objective   Physical Exam  Vitals and nursing note reviewed.   Constitutional:       Appearance: Normal appearance. He is well-developed.   Cardiovascular:      Rate and Rhythm: Normal rate and regular rhythm.   Pulmonary:      Effort: Pulmonary effort is normal.      Breath sounds: Normal breath sounds.   Skin:     General: Skin is warm and dry.   Neurological:      Mental Status: He is alert and oriented to person, place, and time.   Psychiatric:         Behavior: Behavior normal.         Thought Content: Thought content normal.         Judgment: Judgment normal.         Assessment & Plan   Diagnoses and all orders for this visit:    1. Hypertriglyceridemia  -     fenofibrate 160 MG tablet; Take 1 tablet by mouth Daily.  Dispense: 90 tablet; Refill: 1    2. Essential hypertension  -     valsartan (DIOVAN) 160 MG tablet; Take 1 tablet by mouth every morning and ONE HALF tablet every afternoon  Dispense: 135 tablet; Refill: 1  -     Comprehensive metabolic panel  -     Lipid panel  -     CBC and Differential  -     TSH  -     Hemoglobin A1c  -     PSA Screen    3. Screening for prostate cancer  -     PSA Screen    Other orders  -     amLODIPine (NORVASC) 5 MG tablet; Take 1 tablet by mouth Daily.  Dispense: 90 tablet; Refill: 1